# Patient Record
Sex: MALE | Race: WHITE | NOT HISPANIC OR LATINO | Employment: OTHER | URBAN - METROPOLITAN AREA
[De-identification: names, ages, dates, MRNs, and addresses within clinical notes are randomized per-mention and may not be internally consistent; named-entity substitution may affect disease eponyms.]

---

## 2017-05-11 ENCOUNTER — TRANSCRIBE ORDERS (OUTPATIENT)
Dept: ADMINISTRATIVE | Facility: HOSPITAL | Age: 67
End: 2017-05-11

## 2017-05-11 DIAGNOSIS — C61 PROSTATE CANCER (HCC): Primary | ICD-10-CM

## 2017-05-16 ENCOUNTER — HOSPITAL ENCOUNTER (OUTPATIENT)
Dept: NUCLEAR MEDICINE | Facility: HOSPITAL | Age: 67
Discharge: HOME | End: 2017-05-16
Attending: UROLOGY

## 2017-05-16 ENCOUNTER — HOSPITAL ENCOUNTER (OUTPATIENT)
Dept: CT IMAGING | Facility: HOSPITAL | Age: 67
Discharge: HOME | End: 2017-05-16
Attending: UROLOGY | Admitting: UROLOGY

## 2017-05-16 DIAGNOSIS — C61 PROSTATE CANCER (HCC): ICD-10-CM

## 2017-05-16 PROCEDURE — 0 TECHNETIUM MEDRONATE KIT: Performed by: UROLOGY

## 2017-05-16 PROCEDURE — 78306 BONE IMAGING WHOLE BODY: CPT

## 2017-05-16 PROCEDURE — 74177 CT ABD & PELVIS W/CONTRAST: CPT

## 2017-05-16 PROCEDURE — 0 IOPAMIDOL PER 1 ML: Performed by: UROLOGY

## 2017-05-16 PROCEDURE — A9503 TC99M MEDRONATE: HCPCS | Performed by: UROLOGY

## 2017-05-16 RX ORDER — TC 99M MEDRONATE 20 MG/10ML
22.8 INJECTION, POWDER, LYOPHILIZED, FOR SOLUTION INTRAVENOUS
Status: COMPLETED | OUTPATIENT
Start: 2017-05-16 | End: 2017-05-16

## 2017-05-16 RX ADMIN — IOPAMIDOL 100 ML: 755 INJECTION, SOLUTION INTRAVENOUS at 11:16

## 2017-05-16 RX ADMIN — Medication 22.8 MILLICURIE: at 10:30

## 2017-05-22 ENCOUNTER — TRANSCRIBE ORDERS (OUTPATIENT)
Dept: ADMINISTRATIVE | Facility: HOSPITAL | Age: 67
End: 2017-05-22

## 2017-05-22 DIAGNOSIS — R16.0 LIVER MASS: ICD-10-CM

## 2017-05-22 DIAGNOSIS — C61 PROSTATE CA (HCC): Primary | ICD-10-CM

## 2017-05-25 ENCOUNTER — HOSPITAL ENCOUNTER (OUTPATIENT)
Dept: MRI IMAGING | Facility: HOSPITAL | Age: 67
Discharge: HOME | End: 2017-05-25
Admitting: RADIOLOGY

## 2017-05-25 DIAGNOSIS — R16.0 LIVER MASS: ICD-10-CM

## 2017-05-25 DIAGNOSIS — C61 PROSTATE CA (HCC): ICD-10-CM

## 2017-05-25 PROCEDURE — A9577 INJ MULTIHANCE: HCPCS | Performed by: RADIOLOGY

## 2017-05-25 PROCEDURE — 74183 MRI ABD W/O CNTR FLWD CNTR: CPT

## 2017-05-25 PROCEDURE — 0 GADOBENATE DIMEGLUMINE 529 MG/ML SOLUTION: Performed by: RADIOLOGY

## 2017-05-25 RX ADMIN — GADOBENATE DIMEGLUMINE 18 ML: 529 INJECTION, SOLUTION INTRAVENOUS at 08:05

## 2017-08-28 ENCOUNTER — TRANSCRIBE ORDERS (OUTPATIENT)
Dept: ADMINISTRATIVE | Facility: HOSPITAL | Age: 67
End: 2017-08-28

## 2017-08-28 ENCOUNTER — LAB (OUTPATIENT)
Dept: LAB | Facility: HOSPITAL | Age: 67
End: 2017-08-28
Attending: UROLOGY

## 2017-08-28 ENCOUNTER — HOSPITAL ENCOUNTER (OUTPATIENT)
Dept: CARDIOLOGY | Facility: HOSPITAL | Age: 67
Discharge: HOME | End: 2017-08-28
Attending: UROLOGY | Admitting: UROLOGY

## 2017-08-28 DIAGNOSIS — C61 MALIGNANT NEOPLASM OF PROSTATE (HCC): ICD-10-CM

## 2017-08-28 DIAGNOSIS — C61 MALIGNANT NEOPLASM OF PROSTATE (HCC): Primary | ICD-10-CM

## 2017-08-28 LAB
ANION GAP SERPL CALCULATED.3IONS-SCNC: 12.9 MMOL/L
BASOPHILS # BLD AUTO: 0.04 10*3/MM3 (ref 0–0.2)
BASOPHILS NFR BLD AUTO: 0.8 % (ref 0–2)
BILIRUB UR QL STRIP: NEGATIVE
BUN BLD-MCNC: 21 MG/DL (ref 8–23)
BUN/CREAT SERPL: 20.6 (ref 7–25)
CALCIUM SPEC-SCNC: 8.8 MG/DL (ref 8.8–10.5)
CHLORIDE SERPL-SCNC: 100 MMOL/L (ref 98–107)
CLARITY UR: CLEAR
CO2 SERPL-SCNC: 26.1 MMOL/L (ref 22–29)
COLOR UR: YELLOW
CREAT BLD-MCNC: 1.02 MG/DL (ref 0.76–1.27)
DEPRECATED RDW RBC AUTO: 44.3 FL (ref 37–54)
EOSINOPHIL # BLD AUTO: 0.09 10*3/MM3 (ref 0.1–0.3)
EOSINOPHIL NFR BLD AUTO: 1.9 % (ref 0–4)
ERYTHROCYTE [DISTWIDTH] IN BLOOD BY AUTOMATED COUNT: 13 % (ref 11.5–14.5)
GFR SERPL CREATININE-BSD FRML MDRD: 73 ML/MIN/1.73
GLUCOSE BLD-MCNC: 104 MG/DL (ref 65–99)
GLUCOSE UR STRIP-MCNC: NEGATIVE MG/DL
HCT VFR BLD AUTO: 42.9 % (ref 42–52)
HGB BLD-MCNC: 14.6 G/DL (ref 14–18)
HGB UR QL STRIP.AUTO: NEGATIVE
IMM GRANULOCYTES # BLD: 0.01 10*3/MM3 (ref 0–0.03)
IMM GRANULOCYTES NFR BLD: 0.2 % (ref 0–0.5)
KETONES UR QL STRIP: NEGATIVE
LEUKOCYTE ESTERASE UR QL STRIP.AUTO: NEGATIVE
LYMPHOCYTES # BLD AUTO: 1.64 10*3/MM3 (ref 0.6–4.8)
LYMPHOCYTES NFR BLD AUTO: 33.7 % (ref 20–45)
MCH RBC QN AUTO: 31.8 PG (ref 27–31)
MCHC RBC AUTO-ENTMCNC: 34 G/DL (ref 31–37)
MCV RBC AUTO: 93.5 FL (ref 80–94)
MONOCYTES # BLD AUTO: 0.45 10*3/MM3 (ref 0–1)
MONOCYTES NFR BLD AUTO: 9.3 % (ref 3–8)
NEUTROPHILS # BLD AUTO: 2.63 10*3/MM3 (ref 1.5–8.3)
NEUTROPHILS NFR BLD AUTO: 54.1 % (ref 45–70)
NITRITE UR QL STRIP: NEGATIVE
NRBC BLD MANUAL-RTO: 0 /100 WBC (ref 0–0)
PH UR STRIP.AUTO: 7 [PH] (ref 4.5–8)
PLATELET # BLD AUTO: 170 10*3/MM3 (ref 140–500)
PMV BLD AUTO: 9.6 FL (ref 7.4–10.4)
POTASSIUM BLD-SCNC: 4.3 MMOL/L (ref 3.5–5.2)
PROT UR QL STRIP: NEGATIVE
PSA SERPL-MCNC: 2.26 NG/ML (ref 0–4)
RBC # BLD AUTO: 4.59 10*6/MM3 (ref 4.7–6.1)
SODIUM BLD-SCNC: 139 MMOL/L (ref 136–145)
SP GR UR STRIP: 1.01 (ref 1–1.03)
TESTOST SERPL-MCNC: 8.76 NG/DL (ref 193–740)
UROBILINOGEN UR QL STRIP: NORMAL
WBC NRBC COR # BLD: 4.86 10*3/MM3 (ref 4.8–10.8)

## 2017-08-28 PROCEDURE — 93005 ELECTROCARDIOGRAM TRACING: CPT | Performed by: UROLOGY

## 2017-08-28 PROCEDURE — 36415 COLL VENOUS BLD VENIPUNCTURE: CPT

## 2017-08-28 PROCEDURE — 93010 ELECTROCARDIOGRAM REPORT: CPT | Performed by: INTERNAL MEDICINE

## 2017-08-28 PROCEDURE — 85025 COMPLETE CBC W/AUTO DIFF WBC: CPT

## 2017-08-28 PROCEDURE — 84153 ASSAY OF PSA TOTAL: CPT

## 2017-08-28 PROCEDURE — 84403 ASSAY OF TOTAL TESTOSTERONE: CPT

## 2017-08-28 PROCEDURE — 81003 URINALYSIS AUTO W/O SCOPE: CPT

## 2017-08-28 PROCEDURE — 80048 BASIC METABOLIC PNL TOTAL CA: CPT

## 2018-10-15 ENCOUNTER — TRANSCRIBE ORDERS (OUTPATIENT)
Dept: ADMINISTRATIVE | Facility: HOSPITAL | Age: 68
End: 2018-10-15

## 2018-10-15 ENCOUNTER — HOSPITAL ENCOUNTER (OUTPATIENT)
Dept: GENERAL RADIOLOGY | Facility: HOSPITAL | Age: 68
Discharge: HOME | End: 2018-10-15
Attending: UROLOGY | Admitting: UROLOGY

## 2018-10-15 DIAGNOSIS — N20.0 CALCULUS, RENAL: Primary | ICD-10-CM

## 2018-10-15 DIAGNOSIS — N20.0 CALCULUS, RENAL: ICD-10-CM

## 2018-10-15 PROCEDURE — 74018 RADEX ABDOMEN 1 VIEW: CPT

## 2018-10-31 ENCOUNTER — APPOINTMENT (OUTPATIENT)
Dept: PREADMISSION TESTING | Facility: HOSPITAL | Age: 68
End: 2018-10-31

## 2018-10-31 VITALS
WEIGHT: 216 LBS | SYSTOLIC BLOOD PRESSURE: 162 MMHG | OXYGEN SATURATION: 95 % | RESPIRATION RATE: 20 BRPM | TEMPERATURE: 98.1 F | BODY MASS INDEX: 33.9 KG/M2 | DIASTOLIC BLOOD PRESSURE: 78 MMHG | HEIGHT: 67 IN | HEART RATE: 63 BPM

## 2018-10-31 LAB
ALBUMIN SERPL-MCNC: 4.3 G/DL (ref 3.5–5.2)
ALBUMIN/GLOB SERPL: 1.6 G/DL
ALP SERPL-CCNC: 62 U/L (ref 39–117)
ALT SERPL W P-5'-P-CCNC: 22 U/L (ref 1–41)
ANION GAP SERPL CALCULATED.3IONS-SCNC: 10.7 MMOL/L
AST SERPL-CCNC: 21 U/L (ref 1–40)
BACTERIA UR QL AUTO: NORMAL /HPF
BILIRUB SERPL-MCNC: 0.4 MG/DL (ref 0.1–1.2)
BILIRUB UR QL STRIP: NEGATIVE
BUN BLD-MCNC: 18 MG/DL (ref 8–23)
BUN/CREAT SERPL: 14.9 (ref 7–25)
CALCIUM SPEC-SCNC: 9.5 MG/DL (ref 8.6–10.5)
CHLORIDE SERPL-SCNC: 103 MMOL/L (ref 98–107)
CLARITY UR: CLEAR
CO2 SERPL-SCNC: 27.3 MMOL/L (ref 22–29)
COLOR UR: YELLOW
CREAT BLD-MCNC: 1.21 MG/DL (ref 0.76–1.27)
DEPRECATED RDW RBC AUTO: 45.4 FL (ref 37–54)
ERYTHROCYTE [DISTWIDTH] IN BLOOD BY AUTOMATED COUNT: 12.8 % (ref 11.5–14.5)
GFR SERPL CREATININE-BSD FRML MDRD: 59 ML/MIN/1.73
GLOBULIN UR ELPH-MCNC: 2.7 GM/DL
GLUCOSE BLD-MCNC: 100 MG/DL (ref 65–99)
GLUCOSE UR STRIP-MCNC: NEGATIVE MG/DL
HCT VFR BLD AUTO: 45.4 % (ref 40.4–52.2)
HGB BLD-MCNC: 14.4 G/DL (ref 13.7–17.6)
HGB UR QL STRIP.AUTO: NEGATIVE
HYALINE CASTS UR QL AUTO: NORMAL /LPF
KETONES UR QL STRIP: ABNORMAL
LEUKOCYTE ESTERASE UR QL STRIP.AUTO: ABNORMAL
MCH RBC QN AUTO: 30.9 PG (ref 27–32.7)
MCHC RBC AUTO-ENTMCNC: 31.7 G/DL (ref 32.6–36.4)
MCV RBC AUTO: 97.4 FL (ref 79.8–96.2)
NITRITE UR QL STRIP: NEGATIVE
PH UR STRIP.AUTO: 6.5 [PH] (ref 5–8)
PLATELET # BLD AUTO: 158 10*3/MM3 (ref 140–500)
PMV BLD AUTO: 10.2 FL (ref 6–12)
POTASSIUM BLD-SCNC: 4.2 MMOL/L (ref 3.5–5.2)
PROT SERPL-MCNC: 7 G/DL (ref 6–8.5)
PROT UR QL STRIP: NEGATIVE
RBC # BLD AUTO: 4.66 10*6/MM3 (ref 4.6–6)
RBC # UR: NORMAL /HPF
REF LAB TEST METHOD: NORMAL
SODIUM BLD-SCNC: 141 MMOL/L (ref 136–145)
SP GR UR STRIP: 1.02 (ref 1–1.03)
SQUAMOUS #/AREA URNS HPF: NORMAL /HPF
UROBILINOGEN UR QL STRIP: ABNORMAL
WBC NRBC COR # BLD: 5.54 10*3/MM3 (ref 4.5–10.7)
WBC UR QL AUTO: NORMAL /HPF

## 2018-10-31 PROCEDURE — 36415 COLL VENOUS BLD VENIPUNCTURE: CPT

## 2018-10-31 PROCEDURE — 85027 COMPLETE CBC AUTOMATED: CPT | Performed by: UROLOGY

## 2018-10-31 PROCEDURE — 80053 COMPREHEN METABOLIC PANEL: CPT | Performed by: UROLOGY

## 2018-10-31 PROCEDURE — 93010 ELECTROCARDIOGRAM REPORT: CPT | Performed by: INTERNAL MEDICINE

## 2018-10-31 PROCEDURE — 93005 ELECTROCARDIOGRAM TRACING: CPT

## 2018-10-31 PROCEDURE — 81001 URINALYSIS AUTO W/SCOPE: CPT | Performed by: UROLOGY

## 2018-10-31 RX ORDER — ASPIRIN 81 MG/1
81 TABLET ORAL DAILY
COMMUNITY
End: 2018-12-21 | Stop reason: HOSPADM

## 2018-10-31 RX ORDER — SILDENAFIL CITRATE 20 MG/1
10 TABLET ORAL DAILY
COMMUNITY

## 2018-10-31 RX ORDER — ATORVASTATIN CALCIUM 10 MG/1
10 TABLET, FILM COATED ORAL DAILY
COMMUNITY

## 2018-11-02 ENCOUNTER — ANESTHESIA EVENT (OUTPATIENT)
Dept: PERIOP | Facility: HOSPITAL | Age: 68
End: 2018-11-02

## 2018-11-02 ENCOUNTER — HOSPITAL ENCOUNTER (OUTPATIENT)
Facility: HOSPITAL | Age: 68
Setting detail: HOSPITAL OUTPATIENT SURGERY
Discharge: HOME | End: 2018-11-02
Attending: UROLOGY | Admitting: UROLOGY

## 2018-11-02 ENCOUNTER — ANESTHESIA (OUTPATIENT)
Dept: PERIOP | Facility: HOSPITAL | Age: 68
End: 2018-11-02

## 2018-11-02 VITALS
TEMPERATURE: 98.4 F | SYSTOLIC BLOOD PRESSURE: 147 MMHG | RESPIRATION RATE: 16 BRPM | HEART RATE: 51 BPM | DIASTOLIC BLOOD PRESSURE: 90 MMHG | OXYGEN SATURATION: 97 %

## 2018-11-02 PROBLEM — N20.0 RENAL CALCULUS, RIGHT: Status: ACTIVE | Noted: 2018-11-02

## 2018-11-02 PROCEDURE — 25010000002 PROPOFOL 10 MG/ML EMULSION: Performed by: NURSE ANESTHETIST, CERTIFIED REGISTERED

## 2018-11-02 PROCEDURE — 25010000002 KETOROLAC TROMETHAMINE PER 15 MG: Performed by: NURSE ANESTHETIST, CERTIFIED REGISTERED

## 2018-11-02 PROCEDURE — 25010000002 FENTANYL CITRATE (PF) 100 MCG/2ML SOLUTION: Performed by: NURSE ANESTHETIST, CERTIFIED REGISTERED

## 2018-11-02 PROCEDURE — 25010000002 ONDANSETRON PER 1 MG: Performed by: NURSE ANESTHETIST, CERTIFIED REGISTERED

## 2018-11-02 PROCEDURE — 25010000002 MIDAZOLAM PER 1 MG: Performed by: ANESTHESIOLOGY

## 2018-11-02 PROCEDURE — 25010000003 CEFAZOLIN IN DEXTROSE 2-4 GM/100ML-% SOLUTION: Performed by: UROLOGY

## 2018-11-02 RX ORDER — OXYCODONE AND ACETAMINOPHEN 7.5; 325 MG/1; MG/1
1 TABLET ORAL ONCE AS NEEDED
Status: DISCONTINUED | OUTPATIENT
Start: 2018-11-02 | End: 2018-11-02 | Stop reason: HOSPADM

## 2018-11-02 RX ORDER — SODIUM CHLORIDE 0.9 % (FLUSH) 0.9 %
1-10 SYRINGE (ML) INJECTION AS NEEDED
Status: DISCONTINUED | OUTPATIENT
Start: 2018-11-02 | End: 2018-11-02 | Stop reason: HOSPADM

## 2018-11-02 RX ORDER — EPHEDRINE SULFATE 50 MG/ML
5 INJECTION, SOLUTION INTRAVENOUS ONCE AS NEEDED
Status: DISCONTINUED | OUTPATIENT
Start: 2018-11-02 | End: 2018-11-02 | Stop reason: HOSPADM

## 2018-11-02 RX ORDER — ONDANSETRON 2 MG/ML
4 INJECTION INTRAMUSCULAR; INTRAVENOUS ONCE AS NEEDED
Status: DISCONTINUED | OUTPATIENT
Start: 2018-11-02 | End: 2018-11-02 | Stop reason: HOSPADM

## 2018-11-02 RX ORDER — MIDAZOLAM HYDROCHLORIDE 1 MG/ML
2 INJECTION INTRAMUSCULAR; INTRAVENOUS
Status: DISCONTINUED | OUTPATIENT
Start: 2018-11-02 | End: 2018-11-02 | Stop reason: HOSPADM

## 2018-11-02 RX ORDER — CEFAZOLIN SODIUM 2 G/100ML
2 INJECTION, SOLUTION INTRAVENOUS EVERY 8 HOURS
Status: COMPLETED | OUTPATIENT
Start: 2018-11-02 | End: 2018-11-02

## 2018-11-02 RX ORDER — LIDOCAINE HYDROCHLORIDE 20 MG/ML
INJECTION, SOLUTION INFILTRATION; PERINEURAL AS NEEDED
Status: DISCONTINUED | OUTPATIENT
Start: 2018-11-02 | End: 2018-11-02 | Stop reason: SURG

## 2018-11-02 RX ORDER — FLUMAZENIL 0.1 MG/ML
0.2 INJECTION INTRAVENOUS AS NEEDED
Status: DISCONTINUED | OUTPATIENT
Start: 2018-11-02 | End: 2018-11-02 | Stop reason: HOSPADM

## 2018-11-02 RX ORDER — PROMETHAZINE HYDROCHLORIDE 25 MG/1
25 TABLET ORAL ONCE AS NEEDED
Status: DISCONTINUED | OUTPATIENT
Start: 2018-11-02 | End: 2018-11-02 | Stop reason: HOSPADM

## 2018-11-02 RX ORDER — PROMETHAZINE HYDROCHLORIDE 25 MG/1
25 SUPPOSITORY RECTAL ONCE AS NEEDED
Status: DISCONTINUED | OUTPATIENT
Start: 2018-11-02 | End: 2018-11-02 | Stop reason: HOSPADM

## 2018-11-02 RX ORDER — LABETALOL HYDROCHLORIDE 5 MG/ML
5 INJECTION, SOLUTION INTRAVENOUS
Status: DISCONTINUED | OUTPATIENT
Start: 2018-11-02 | End: 2018-11-02 | Stop reason: HOSPADM

## 2018-11-02 RX ORDER — DIPHENHYDRAMINE HYDROCHLORIDE 50 MG/ML
12.5 INJECTION INTRAMUSCULAR; INTRAVENOUS
Status: DISCONTINUED | OUTPATIENT
Start: 2018-11-02 | End: 2018-11-02 | Stop reason: HOSPADM

## 2018-11-02 RX ORDER — HYDROMORPHONE HYDROCHLORIDE 1 MG/ML
0.5 INJECTION, SOLUTION INTRAMUSCULAR; INTRAVENOUS; SUBCUTANEOUS
Status: DISCONTINUED | OUTPATIENT
Start: 2018-11-02 | End: 2018-11-02 | Stop reason: HOSPADM

## 2018-11-02 RX ORDER — PROMETHAZINE HYDROCHLORIDE 25 MG/ML
12.5 INJECTION, SOLUTION INTRAMUSCULAR; INTRAVENOUS ONCE AS NEEDED
Status: DISCONTINUED | OUTPATIENT
Start: 2018-11-02 | End: 2018-11-02 | Stop reason: HOSPADM

## 2018-11-02 RX ORDER — SODIUM CHLORIDE, SODIUM LACTATE, POTASSIUM CHLORIDE, CALCIUM CHLORIDE 600; 310; 30; 20 MG/100ML; MG/100ML; MG/100ML; MG/100ML
9 INJECTION, SOLUTION INTRAVENOUS CONTINUOUS
Status: DISCONTINUED | OUTPATIENT
Start: 2018-11-02 | End: 2018-11-02 | Stop reason: HOSPADM

## 2018-11-02 RX ORDER — HYDROCODONE BITARTRATE AND ACETAMINOPHEN 7.5; 325 MG/1; MG/1
1-2 TABLET ORAL EVERY 4 HOURS PRN
Qty: 20 TABLET | Refills: 0 | Status: SHIPPED | OUTPATIENT
Start: 2018-11-02 | End: 2018-12-17

## 2018-11-02 RX ORDER — FENTANYL CITRATE 50 UG/ML
INJECTION, SOLUTION INTRAMUSCULAR; INTRAVENOUS AS NEEDED
Status: DISCONTINUED | OUTPATIENT
Start: 2018-11-02 | End: 2018-11-02 | Stop reason: SURG

## 2018-11-02 RX ORDER — MIDAZOLAM HYDROCHLORIDE 1 MG/ML
1 INJECTION INTRAMUSCULAR; INTRAVENOUS
Status: DISCONTINUED | OUTPATIENT
Start: 2018-11-02 | End: 2018-11-02 | Stop reason: HOSPADM

## 2018-11-02 RX ORDER — LIDOCAINE HYDROCHLORIDE 10 MG/ML
0.5 INJECTION, SOLUTION EPIDURAL; INFILTRATION; INTRACAUDAL; PERINEURAL ONCE AS NEEDED
Status: DISCONTINUED | OUTPATIENT
Start: 2018-11-02 | End: 2018-11-02 | Stop reason: HOSPADM

## 2018-11-02 RX ORDER — NALOXONE HCL 0.4 MG/ML
0.2 VIAL (ML) INJECTION AS NEEDED
Status: DISCONTINUED | OUTPATIENT
Start: 2018-11-02 | End: 2018-11-02 | Stop reason: HOSPADM

## 2018-11-02 RX ORDER — FENTANYL CITRATE 50 UG/ML
50 INJECTION, SOLUTION INTRAMUSCULAR; INTRAVENOUS
Status: DISCONTINUED | OUTPATIENT
Start: 2018-11-02 | End: 2018-11-02 | Stop reason: HOSPADM

## 2018-11-02 RX ORDER — ONDANSETRON 2 MG/ML
INJECTION INTRAMUSCULAR; INTRAVENOUS AS NEEDED
Status: DISCONTINUED | OUTPATIENT
Start: 2018-11-02 | End: 2018-11-02 | Stop reason: SURG

## 2018-11-02 RX ORDER — FAMOTIDINE 10 MG/ML
20 INJECTION, SOLUTION INTRAVENOUS ONCE
Status: COMPLETED | OUTPATIENT
Start: 2018-11-02 | End: 2018-11-02

## 2018-11-02 RX ORDER — HYDROCODONE BITARTRATE AND ACETAMINOPHEN 7.5; 325 MG/1; MG/1
1 TABLET ORAL ONCE AS NEEDED
Status: COMPLETED | OUTPATIENT
Start: 2018-11-02 | End: 2018-11-02

## 2018-11-02 RX ORDER — CIPROFLOXACIN 500 MG/1
500 TABLET, FILM COATED ORAL 2 TIMES DAILY
Qty: 6 TABLET | Refills: 0 | Status: SHIPPED | OUTPATIENT
Start: 2018-11-02 | End: 2018-11-05

## 2018-11-02 RX ORDER — PROMETHAZINE HYDROCHLORIDE 25 MG/1
12.5 TABLET ORAL ONCE AS NEEDED
Status: DISCONTINUED | OUTPATIENT
Start: 2018-11-02 | End: 2018-11-02 | Stop reason: HOSPADM

## 2018-11-02 RX ORDER — KETOROLAC TROMETHAMINE 30 MG/ML
INJECTION, SOLUTION INTRAMUSCULAR; INTRAVENOUS AS NEEDED
Status: DISCONTINUED | OUTPATIENT
Start: 2018-11-02 | End: 2018-11-02 | Stop reason: SURG

## 2018-11-02 RX ORDER — PROPOFOL 10 MG/ML
VIAL (ML) INTRAVENOUS AS NEEDED
Status: DISCONTINUED | OUTPATIENT
Start: 2018-11-02 | End: 2018-11-02 | Stop reason: SURG

## 2018-11-02 RX ADMIN — MIDAZOLAM 2 MG: 1 INJECTION INTRAMUSCULAR; INTRAVENOUS at 09:12

## 2018-11-02 RX ADMIN — FENTANYL CITRATE 100 MCG: 50 INJECTION INTRAMUSCULAR; INTRAVENOUS at 09:24

## 2018-11-02 RX ADMIN — CEFAZOLIN SODIUM 2 G: 2 INJECTION, SOLUTION INTRAVENOUS at 09:27

## 2018-11-02 RX ADMIN — KETOROLAC TROMETHAMINE 30 MG: 30 INJECTION, SOLUTION INTRAMUSCULAR; INTRAVENOUS at 09:33

## 2018-11-02 RX ADMIN — LIDOCAINE HYDROCHLORIDE 60 MG: 20 INJECTION, SOLUTION INFILTRATION; PERINEURAL at 09:24

## 2018-11-02 RX ADMIN — PROPOFOL 200 MG: 10 INJECTION, EMULSION INTRAVENOUS at 09:24

## 2018-11-02 RX ADMIN — FAMOTIDINE 20 MG: 10 INJECTION INTRAVENOUS at 09:12

## 2018-11-02 RX ADMIN — ONDANSETRON 4 MG: 2 INJECTION INTRAMUSCULAR; INTRAVENOUS at 09:33

## 2018-11-02 RX ADMIN — SODIUM CHLORIDE, POTASSIUM CHLORIDE, SODIUM LACTATE AND CALCIUM CHLORIDE 9 ML/HR: 600; 310; 30; 20 INJECTION, SOLUTION INTRAVENOUS at 08:13

## 2018-11-02 RX ADMIN — HYDROCODONE BITARTRATE AND ACETAMINOPHEN 1 TABLET: 7.5; 325 TABLET ORAL at 10:26

## 2018-11-02 NOTE — ANESTHESIA POSTPROCEDURE EVALUATION
Patient: Christiano Sandoval    Procedure Summary     Date:  11/02/18 Room / Location:   [facility]    Anesthesia Start:  0922 Anesthesia Stop:  1005    Procedure:  RIGHT EXTRACORPOREAL SHOCKWAVE LITHOTRIPSY (Right ) Diagnosis:      Surgeon:  Kevin Manzano MD Provider:  Reji Arias MD    Anesthesia Type:  general ASA Status:  3          Anesthesia Type: general  Last vitals  BP   147/90 (11/02/18 1045)   Temp   36.9 °C (98.4 °F) (11/02/18 1002)   Pulse   51 (11/02/18 1045)   Resp   16 (11/02/18 1045)     SpO2   97 % (11/02/18 1045)     Post Anesthesia Care and Evaluation    Patient location during evaluation: bedside  Patient participation: complete - patient participated  Level of consciousness: awake and alert  Pain management: adequate  Airway patency: patent  Anesthetic complications: No anesthetic complications    Cardiovascular status: acceptable  Respiratory status: acceptable  Hydration status: acceptable    Comments: /90   Pulse 51   Temp 36.9 °C (98.4 °F) (Temporal Artery )   Resp 16   SpO2 97%

## 2018-11-02 NOTE — ANESTHESIA PREPROCEDURE EVALUATION
Anesthesia Evaluation     Patient summary reviewed and Nursing notes reviewed   no history of anesthetic complications:               Airway   Mallampati: III  TM distance: >3 FB  Neck ROM: full  Large neck circumference  Dental - normal exam     Pulmonary - negative pulmonary ROS    breath sounds clear to auscultation  (-) shortness of breath, sleep apnea, decreased breath sounds, wheezes  Cardiovascular - normal exam  Exercise tolerance: good (4-7 METS)    Rhythm: regular  Rate: normal    (+) hyperlipidemia,   (-) past MI, angina, CHF, orthopnea, PND, COKER, PVD      Neuro/Psych- negative ROS  (-) seizures, neuromuscular disease, TIA, CVA, dizziness/light headedness, weakness, numbness  GI/Hepatic/Renal/Endo    (+) obesity,  GERD,    (-) liver disease, diabetes    Musculoskeletal (-) negative ROS    Abdominal  - normal exam   Substance History - negative use  (-) alcohol use, drug use     OB/GYN negative ob/gyn ROS         Other - negative ROS                       Anesthesia Plan    ASA 3     general     intravenous induction   Anesthetic plan, all risks, benefits, and alternatives have been provided, discussed and informed consent has been obtained with: patient.    Plan discussed with CRNA.

## 2018-11-02 NOTE — H&P
FIRST UROLOGY CONSULT      Patient Identification:  NAME:  Christiano Sandoval  Age:  68 y.o.   Sex:  male   :  1950   MRN:  [id number]       Chief complaint: 68-year-old with kidney stone    History of present illness: *History of prostate cancer.  The have a 15 mm right lower pole stone and scheduled for surgical treatment.  He is a patient Dr. Randy Lindsay  Past medical history:  Past Medical History:   Diagnosis Date   • Cancer (CMS/HCC)     PROSTATE   • GERD (gastroesophageal reflux disease)    • History of hepatitis B    • Hyperlipidemia    • Kidney stone        Past surgical history:  Past Surgical History:   Procedure Laterality Date   • COLONOSCOPY         Allergies:  Patient has no known allergies.    Home medications:  Prescriptions Prior to Admission   Medication Sig Dispense Refill Last Dose   • atorvastatin (LIPITOR) 10 MG tablet Take 10 mg by mouth Daily.   2018 at Unknown time   • sildenafil (REVATIO) 20 MG tablet Take 20 mg by mouth Daily.   2018 at Unknown time   • aspirin 81 MG EC tablet Take 81 mg by mouth Daily.   10/26/2018        Hospital medications:    ceFAZolin 2 g Intravenous Q8H            Family history:  Family History   Problem Relation Age of Onset   • Malig Hyperthermia Neg Hx        Social history:  Social History   Substance Use Topics   • Smoking status: Former Smoker     Packs/day: 0.50     Types: Cigarettes   • Smokeless tobacco: Never Used      Comment: QUIT 40 YRS AGO   • Alcohol use 1.8 oz/week     3 Cans of beer per week      Comment: DAILY       Review of systems:    Negative 12-system ROS except for the following:        Objective:  TMax 24 hours:   Temp (24hrs), Av °F (36.7 °C), Min:98 °F (36.7 °C), Max:98 °F (36.7 °C)      Vitals Ranges:   Temp:  [98 °F (36.7 °C)] 98 °F (36.7 °C)  Heart Rate:  [64] 64  Resp:  [18] 18  BP: (146)/(77) 146/77    Intake/Output Last 3 shifts:  No intake/output data recorded.     Physical Exam:       General  Appearance:    Alert, cooperative, in no acute distress   Head:    Normocephalic, without obvious abnormality, atraumatic   Eyes:          PERRL, conjunctivae and corneas clear   Ears:    Normal external inspection   Throat:   No oral lesions, oral mucosa moist   Neck:   Supple, no LAD, trachea midline   Back:     No CVA tenderness   Lungs:     Respirations unlabored, symmetric excursion    Heart:    RRR, intact peripheral pulses   Abdomen:     Soft, NDNT, no masses, no guarding   :    Testes descended bilaterally, no nodules.  Penis normal.  No scrotal or penile rashes noted   Extremities:   No edema, no deformity   Skin:   No bleeding, bruising or rashes   Neuro/Psych:   Orientation intact, mood/affect pleasant, no focal findings       Results review:   I reviewed the patient's new clinical results.    Data review:  Lab Results (last 24 hours)     ** No results found for the last 24 hours. **           Imaging:  Imaging Results (last 24 hours)     ** No results found for the last 24 hours. **             Assessment:       * No active hospital problems. *    Right renal calculus    Plan:     R ESWL    Kevin Manzano MD  11/02/18  8:32 AM

## 2018-11-02 NOTE — ANESTHESIA PROCEDURE NOTES
Airway  Urgency: elective      General Information and Staff    Patient location during procedure: OR  Anesthesiologist: JOI GRIFFITH  CRNA: JENNIE CAMACHO    Indications and Patient Condition    Preoxygenated: yes  Mask difficulty assessment: 0 - not attempted    Final Airway Details  Final airway type: supraglottic airway      Successful airway: unique  Size 5    Number of attempts at approach: 1    Additional Comments  Atraumatic, adeq seal, secured, MV/AV until SV

## 2018-11-14 ENCOUNTER — TRANSCRIBE ORDERS (OUTPATIENT)
Dept: ADMINISTRATIVE | Facility: HOSPITAL | Age: 68
End: 2018-11-14

## 2018-11-14 ENCOUNTER — HOSPITAL ENCOUNTER (OUTPATIENT)
Dept: GENERAL RADIOLOGY | Facility: HOSPITAL | Age: 68
Discharge: HOME | End: 2018-11-14
Attending: UROLOGY | Admitting: UROLOGY

## 2018-11-14 DIAGNOSIS — N20.0 CALCULUS, RENAL: ICD-10-CM

## 2018-11-14 DIAGNOSIS — N20.0 CALCULUS, RENAL: Primary | ICD-10-CM

## 2018-11-14 PROCEDURE — 74018 RADEX ABDOMEN 1 VIEW: CPT

## 2018-12-14 ENCOUNTER — APPOINTMENT (OUTPATIENT)
Dept: PREADMISSION TESTING | Facility: HOSPITAL | Age: 68
End: 2018-12-14

## 2018-12-17 ENCOUNTER — APPOINTMENT (OUTPATIENT)
Dept: PREADMISSION TESTING | Facility: HOSPITAL | Age: 68
End: 2018-12-17

## 2018-12-17 VITALS
DIASTOLIC BLOOD PRESSURE: 87 MMHG | HEART RATE: 62 BPM | RESPIRATION RATE: 16 BRPM | SYSTOLIC BLOOD PRESSURE: 169 MMHG | BODY MASS INDEX: 34.53 KG/M2 | WEIGHT: 220 LBS | TEMPERATURE: 98.7 F | OXYGEN SATURATION: 98 % | HEIGHT: 67 IN

## 2018-12-17 LAB
ANION GAP SERPL CALCULATED.3IONS-SCNC: 10.3 MMOL/L
BILIRUB UR QL STRIP: NEGATIVE
BUN BLD-MCNC: 18 MG/DL (ref 8–23)
BUN/CREAT SERPL: 19.6 (ref 7–25)
CALCIUM SPEC-SCNC: 9.1 MG/DL (ref 8.6–10.5)
CHLORIDE SERPL-SCNC: 103 MMOL/L (ref 98–107)
CLARITY UR: CLEAR
CO2 SERPL-SCNC: 26.7 MMOL/L (ref 22–29)
COLOR UR: YELLOW
CREAT BLD-MCNC: 0.92 MG/DL (ref 0.76–1.27)
DEPRECATED RDW RBC AUTO: 44 FL (ref 37–54)
ERYTHROCYTE [DISTWIDTH] IN BLOOD BY AUTOMATED COUNT: 12.9 % (ref 11.5–14.5)
GFR SERPL CREATININE-BSD FRML MDRD: 81 ML/MIN/1.73
GLUCOSE BLD-MCNC: 115 MG/DL (ref 65–99)
GLUCOSE UR STRIP-MCNC: NEGATIVE MG/DL
HCT VFR BLD AUTO: 42.3 % (ref 40.4–52.2)
HGB BLD-MCNC: 14.4 G/DL (ref 13.7–17.6)
HGB UR QL STRIP.AUTO: NEGATIVE
KETONES UR QL STRIP: NEGATIVE
LEUKOCYTE ESTERASE UR QL STRIP.AUTO: NEGATIVE
MCH RBC QN AUTO: 32.1 PG (ref 27–32.7)
MCHC RBC AUTO-ENTMCNC: 34 G/DL (ref 32.6–36.4)
MCV RBC AUTO: 94.2 FL (ref 79.8–96.2)
NITRITE UR QL STRIP: NEGATIVE
PH UR STRIP.AUTO: 7 [PH] (ref 5–8)
PLATELET # BLD AUTO: 164 10*3/MM3 (ref 140–500)
PMV BLD AUTO: 9.9 FL (ref 6–12)
POTASSIUM BLD-SCNC: 4.3 MMOL/L (ref 3.5–5.2)
PROT UR QL STRIP: NEGATIVE
RBC # BLD AUTO: 4.49 10*6/MM3 (ref 4.6–6)
SODIUM BLD-SCNC: 140 MMOL/L (ref 136–145)
SP GR UR STRIP: 1.02 (ref 1–1.03)
UROBILINOGEN UR QL STRIP: NORMAL
WBC NRBC COR # BLD: 4.86 10*3/MM3 (ref 4.5–10.7)

## 2018-12-17 PROCEDURE — 80048 BASIC METABOLIC PNL TOTAL CA: CPT | Performed by: UROLOGY

## 2018-12-17 PROCEDURE — 81003 URINALYSIS AUTO W/O SCOPE: CPT | Performed by: UROLOGY

## 2018-12-17 PROCEDURE — 85027 COMPLETE CBC AUTOMATED: CPT | Performed by: UROLOGY

## 2018-12-17 PROCEDURE — 36415 COLL VENOUS BLD VENIPUNCTURE: CPT

## 2018-12-17 NOTE — DISCHARGE INSTRUCTIONS
Take the following medications the morning of surgery with a small sip of water:  NONE    PLEASE ARRIVE AT THE HOSPITAL AT 11 AM ON December 21, 2018    General Instructions:  • Do not eat or drink anything after 3 AM the night before surgery.  • Infants may have breast milk up to four hours before surgery.  • Infants drinking formula may drink formula up to six hours before surgery.   • Patients who avoid smoking, chewing tobacco and alcohol for 4 weeks prior to surgery have a reduced risk of post-operative complications.  Quit smoking as many days before surgery as you can.  • Do not smoke, use chewing tobacco or drink alcohol the day of surgery.   • If applicable bring your C-PAP/ BI-PAP machine.  • Bring any papers given to you in the doctor’s office.  • Wear clean comfortable clothes and socks.  • Do not wear contact lenses or make-up.  Bring a case for your glasses.   • Bring crutches or walker if applicable.  • Remove all piercings.  Leave jewelry and any other valuables at home.  • Hair extensions with metal clips must be removed prior to surgery.  • The Pre-Admission Testing nurse will instruct you to bring medications if unable to obtain an accurate list in Pre-Admission Testing.      Preventing a Surgical Site Infection:  • For 2 to 3 days before surgery, avoid shaving with a razor because the razor can irritate skin and make it easier to develop an infection.    • Any areas of open skin can increase the risk of a post-operative wound infection by allowing bacteria to enter and travel throughout the body.  Notify your surgeon if you have any skin wounds / rashes even if it is not near the expected surgical site.  The area will need assessed to determine if surgery should be delayed until it is healed.  • The night prior to surgery sleep in a clean bed with clean clothing.  Do not allow pets to sleep with you.  • Shower on the morning of surgery using a fresh bar of anti-bacterial soap (such as Dial) and  clean washcloth.  Dry with a clean towel and dress in clean clothing.  • Ask your surgeon if you will be receiving antibiotics prior to surgery.  • Make sure you, your family, and all healthcare providers clean their hands with soap and water or an alcohol based hand  before caring for you or your wound.    Day of surgery:  Upon arrival, a Pre-op nurse and Anesthesiologist will review your health history, obtain vital signs, and answer questions you may have.  The only belongings needed at this time will be your home medications and if applicable your C-PAP/BI-PAP machine.  If you are staying overnight your family can leave the rest of your belongings in the car and bring them to your room later.  A Pre-op nurse will start an IV and you may receive medication in preparation for surgery, including something to help you relax.  Your family will be able to see you in the Pre-op area.  While you are in surgery your family should notify the waiting room  if they leave the waiting room area and provide a contact phone number.    Please be aware that surgery does come with discomfort.  We want to make every effort to control your discomfort so please discuss any uncontrolled symptoms with your nurse.   Your doctor will most likely have prescribed pain medications.      If you are going home after surgery you will receive individualized written care instructions before being discharged.  A responsible adult must drive you to and from the hospital on the day of your surgery and stay with you for 24 hours.    If you are staying overnight following surgery, you will be transported to your hospital room following the recovery period.  [facility] has all private rooms.    You have received a list of surgical assistants for your reference.  If you have any questions please call Pre-Admission Testing at [phone].  Deductibles and co-payments are collected on the day of service. Please be  prepared to pay the required co-pay, deductible or deposit on the day of service as defined by your plan.

## 2018-12-21 ENCOUNTER — HOSPITAL ENCOUNTER (OUTPATIENT)
Facility: HOSPITAL | Age: 68
Setting detail: HOSPITAL OUTPATIENT SURGERY
Discharge: HOME | End: 2018-12-21
Attending: UROLOGY | Admitting: UROLOGY

## 2018-12-21 ENCOUNTER — ANESTHESIA (OUTPATIENT)
Dept: PERIOP | Facility: HOSPITAL | Age: 68
End: 2018-12-21

## 2018-12-21 ENCOUNTER — ANESTHESIA EVENT (OUTPATIENT)
Dept: PERIOP | Facility: HOSPITAL | Age: 68
End: 2018-12-21

## 2018-12-21 VITALS
DIASTOLIC BLOOD PRESSURE: 92 MMHG | OXYGEN SATURATION: 97 % | HEART RATE: 57 BPM | TEMPERATURE: 98.6 F | RESPIRATION RATE: 16 BRPM | SYSTOLIC BLOOD PRESSURE: 157 MMHG

## 2018-12-21 PROCEDURE — 25010000002 PROPOFOL 10 MG/ML EMULSION: Performed by: NURSE ANESTHETIST, CERTIFIED REGISTERED

## 2018-12-21 PROCEDURE — 25010000002 ONDANSETRON PER 1 MG: Performed by: NURSE ANESTHETIST, CERTIFIED REGISTERED

## 2018-12-21 PROCEDURE — 25010000003 CEFAZOLIN IN DEXTROSE 2-4 GM/100ML-% SOLUTION: Performed by: UROLOGY

## 2018-12-21 PROCEDURE — 25010000002 DEXAMETHASONE PER 1 MG: Performed by: NURSE ANESTHETIST, CERTIFIED REGISTERED

## 2018-12-21 PROCEDURE — 25010000002 MIDAZOLAM PER 1 MG: Performed by: ANESTHESIOLOGY

## 2018-12-21 RX ORDER — DEXAMETHASONE SODIUM PHOSPHATE 10 MG/ML
INJECTION INTRAMUSCULAR; INTRAVENOUS AS NEEDED
Status: DISCONTINUED | OUTPATIENT
Start: 2018-12-21 | End: 2018-12-21 | Stop reason: SURG

## 2018-12-21 RX ORDER — ONDANSETRON 4 MG/1
4 TABLET, FILM COATED ORAL ONCE AS NEEDED
Status: DISCONTINUED | OUTPATIENT
Start: 2018-12-21 | End: 2018-12-21 | Stop reason: HOSPADM

## 2018-12-21 RX ORDER — ACETAMINOPHEN 325 MG/1
650 TABLET ORAL ONCE AS NEEDED
Status: DISCONTINUED | OUTPATIENT
Start: 2018-12-21 | End: 2018-12-21 | Stop reason: HOSPADM

## 2018-12-21 RX ORDER — CEFAZOLIN SODIUM 2 G/100ML
2 INJECTION, SOLUTION INTRAVENOUS ONCE
Status: COMPLETED | OUTPATIENT
Start: 2018-12-21 | End: 2018-12-21

## 2018-12-21 RX ORDER — DIPHENHYDRAMINE HCL 25 MG
25 CAPSULE ORAL
Status: DISCONTINUED | OUTPATIENT
Start: 2018-12-21 | End: 2018-12-21 | Stop reason: HOSPADM

## 2018-12-21 RX ORDER — EPHEDRINE SULFATE 50 MG/ML
5 INJECTION, SOLUTION INTRAVENOUS ONCE AS NEEDED
Status: DISCONTINUED | OUTPATIENT
Start: 2018-12-21 | End: 2018-12-21 | Stop reason: HOSPADM

## 2018-12-21 RX ORDER — SODIUM CHLORIDE 0.9 % (FLUSH) 0.9 %
1-10 SYRINGE (ML) INJECTION AS NEEDED
Status: DISCONTINUED | OUTPATIENT
Start: 2018-12-21 | End: 2018-12-21 | Stop reason: HOSPADM

## 2018-12-21 RX ORDER — PROMETHAZINE HYDROCHLORIDE 25 MG/1
25 TABLET ORAL ONCE AS NEEDED
Status: DISCONTINUED | OUTPATIENT
Start: 2018-12-21 | End: 2018-12-21 | Stop reason: HOSPADM

## 2018-12-21 RX ORDER — FLUMAZENIL 0.1 MG/ML
0.2 INJECTION INTRAVENOUS AS NEEDED
Status: DISCONTINUED | OUTPATIENT
Start: 2018-12-21 | End: 2018-12-21 | Stop reason: HOSPADM

## 2018-12-21 RX ORDER — PROPOFOL 10 MG/ML
VIAL (ML) INTRAVENOUS AS NEEDED
Status: DISCONTINUED | OUTPATIENT
Start: 2018-12-21 | End: 2018-12-21 | Stop reason: SURG

## 2018-12-21 RX ORDER — HYDROCODONE BITARTRATE AND ACETAMINOPHEN 7.5; 325 MG/1; MG/1
1 TABLET ORAL ONCE AS NEEDED
Status: DISCONTINUED | OUTPATIENT
Start: 2018-12-21 | End: 2018-12-21 | Stop reason: HOSPADM

## 2018-12-21 RX ORDER — PROMETHAZINE HYDROCHLORIDE 25 MG/1
25 SUPPOSITORY RECTAL ONCE AS NEEDED
Status: DISCONTINUED | OUTPATIENT
Start: 2018-12-21 | End: 2018-12-21 | Stop reason: HOSPADM

## 2018-12-21 RX ORDER — LIDOCAINE HYDROCHLORIDE 10 MG/ML
0.5 INJECTION, SOLUTION EPIDURAL; INFILTRATION; INTRACAUDAL; PERINEURAL ONCE AS NEEDED
Status: COMPLETED | OUTPATIENT
Start: 2018-12-21 | End: 2018-12-21

## 2018-12-21 RX ORDER — OXYCODONE AND ACETAMINOPHEN 7.5; 325 MG/1; MG/1
1 TABLET ORAL ONCE AS NEEDED
Status: DISCONTINUED | OUTPATIENT
Start: 2018-12-21 | End: 2018-12-21 | Stop reason: HOSPADM

## 2018-12-21 RX ORDER — FENTANYL CITRATE 50 UG/ML
50 INJECTION, SOLUTION INTRAMUSCULAR; INTRAVENOUS
Status: DISCONTINUED | OUTPATIENT
Start: 2018-12-21 | End: 2018-12-21 | Stop reason: HOSPADM

## 2018-12-21 RX ORDER — LIDOCAINE HYDROCHLORIDE 20 MG/ML
INJECTION, SOLUTION INFILTRATION; PERINEURAL AS NEEDED
Status: DISCONTINUED | OUTPATIENT
Start: 2018-12-21 | End: 2018-12-21 | Stop reason: SURG

## 2018-12-21 RX ORDER — DIPHENHYDRAMINE HYDROCHLORIDE 50 MG/ML
12.5 INJECTION INTRAMUSCULAR; INTRAVENOUS
Status: DISCONTINUED | OUTPATIENT
Start: 2018-12-21 | End: 2018-12-21 | Stop reason: HOSPADM

## 2018-12-21 RX ORDER — ACETAMINOPHEN 650 MG/1
650 SUPPOSITORY RECTAL ONCE AS NEEDED
Status: DISCONTINUED | OUTPATIENT
Start: 2018-12-21 | End: 2018-12-21 | Stop reason: HOSPADM

## 2018-12-21 RX ORDER — OXYCODONE HYDROCHLORIDE AND ACETAMINOPHEN 5; 325 MG/1; MG/1
1 TABLET ORAL ONCE AS NEEDED
Status: DISCONTINUED | OUTPATIENT
Start: 2018-12-21 | End: 2018-12-21 | Stop reason: HOSPADM

## 2018-12-21 RX ORDER — SODIUM CHLORIDE, SODIUM LACTATE, POTASSIUM CHLORIDE, CALCIUM CHLORIDE 600; 310; 30; 20 MG/100ML; MG/100ML; MG/100ML; MG/100ML
9 INJECTION, SOLUTION INTRAVENOUS CONTINUOUS
Status: DISCONTINUED | OUTPATIENT
Start: 2018-12-21 | End: 2018-12-21 | Stop reason: HOSPADM

## 2018-12-21 RX ORDER — FAMOTIDINE 10 MG/ML
20 INJECTION, SOLUTION INTRAVENOUS ONCE
Status: COMPLETED | OUTPATIENT
Start: 2018-12-21 | End: 2018-12-21

## 2018-12-21 RX ORDER — HYDRALAZINE HYDROCHLORIDE 20 MG/ML
5 INJECTION INTRAMUSCULAR; INTRAVENOUS
Status: DISCONTINUED | OUTPATIENT
Start: 2018-12-21 | End: 2018-12-21 | Stop reason: HOSPADM

## 2018-12-21 RX ORDER — LABETALOL HYDROCHLORIDE 5 MG/ML
5 INJECTION, SOLUTION INTRAVENOUS
Status: DISCONTINUED | OUTPATIENT
Start: 2018-12-21 | End: 2018-12-21 | Stop reason: HOSPADM

## 2018-12-21 RX ORDER — PROMETHAZINE HYDROCHLORIDE 25 MG/ML
12.5 INJECTION, SOLUTION INTRAMUSCULAR; INTRAVENOUS ONCE AS NEEDED
Status: DISCONTINUED | OUTPATIENT
Start: 2018-12-21 | End: 2018-12-21 | Stop reason: HOSPADM

## 2018-12-21 RX ORDER — ONDANSETRON 2 MG/ML
4 INJECTION INTRAMUSCULAR; INTRAVENOUS ONCE AS NEEDED
Status: DISCONTINUED | OUTPATIENT
Start: 2018-12-21 | End: 2018-12-21 | Stop reason: HOSPADM

## 2018-12-21 RX ORDER — NALOXONE HCL 0.4 MG/ML
0.2 VIAL (ML) INJECTION AS NEEDED
Status: DISCONTINUED | OUTPATIENT
Start: 2018-12-21 | End: 2018-12-21 | Stop reason: HOSPADM

## 2018-12-21 RX ORDER — MIDAZOLAM HYDROCHLORIDE 1 MG/ML
1 INJECTION INTRAMUSCULAR; INTRAVENOUS
Status: DISCONTINUED | OUTPATIENT
Start: 2018-12-21 | End: 2018-12-21 | Stop reason: HOSPADM

## 2018-12-21 RX ORDER — MIDAZOLAM HYDROCHLORIDE 1 MG/ML
2 INJECTION INTRAMUSCULAR; INTRAVENOUS
Status: DISCONTINUED | OUTPATIENT
Start: 2018-12-21 | End: 2018-12-21 | Stop reason: HOSPADM

## 2018-12-21 RX ORDER — HYDROMORPHONE HYDROCHLORIDE 1 MG/ML
0.5 INJECTION, SOLUTION INTRAMUSCULAR; INTRAVENOUS; SUBCUTANEOUS
Status: DISCONTINUED | OUTPATIENT
Start: 2018-12-21 | End: 2018-12-21 | Stop reason: HOSPADM

## 2018-12-21 RX ORDER — ONDANSETRON 2 MG/ML
INJECTION INTRAMUSCULAR; INTRAVENOUS AS NEEDED
Status: DISCONTINUED | OUTPATIENT
Start: 2018-12-21 | End: 2018-12-21 | Stop reason: SURG

## 2018-12-21 RX ADMIN — SODIUM CHLORIDE, POTASSIUM CHLORIDE, SODIUM LACTATE AND CALCIUM CHLORIDE 9 ML/HR: 600; 310; 30; 20 INJECTION, SOLUTION INTRAVENOUS at 12:05

## 2018-12-21 RX ADMIN — ONDANSETRON 4 MG: 2 INJECTION INTRAMUSCULAR; INTRAVENOUS at 13:05

## 2018-12-21 RX ADMIN — LIDOCAINE HYDROCHLORIDE 60 MG: 20 INJECTION, SOLUTION INFILTRATION; PERINEURAL at 13:00

## 2018-12-21 RX ADMIN — DEXAMETHASONE SODIUM PHOSPHATE 4 MG: 10 INJECTION INTRAMUSCULAR; INTRAVENOUS at 13:05

## 2018-12-21 RX ADMIN — PROPOFOL 200 MG: 10 INJECTION, EMULSION INTRAVENOUS at 13:00

## 2018-12-21 RX ADMIN — LIDOCAINE HYDROCHLORIDE 0.5 ML: 10 INJECTION, SOLUTION EPIDURAL; INFILTRATION; INTRACAUDAL; PERINEURAL at 12:05

## 2018-12-21 RX ADMIN — FAMOTIDINE 20 MG: 10 INJECTION, SOLUTION INTRAVENOUS at 12:22

## 2018-12-21 RX ADMIN — MIDAZOLAM HYDROCHLORIDE 2 MG: 2 INJECTION, SOLUTION INTRAMUSCULAR; INTRAVENOUS at 12:22

## 2018-12-21 RX ADMIN — CEFAZOLIN SODIUM 2 G: 2 INJECTION, SOLUTION INTRAVENOUS at 13:02

## 2018-12-21 NOTE — ANESTHESIA PREPROCEDURE EVALUATION
Anesthesia Evaluation     Patient summary reviewed and Nursing notes reviewed   NPO Solid Status: > 8 hours  NPO Liquid Status: > 2 hours           Airway   Mallampati: II  no difficulty expected  Dental - normal exam     Pulmonary     breath sounds clear to auscultation  (+) a smoker Former,   Cardiovascular     ECG reviewed  Rhythm: regular  Rate: normal    (+) hypertension, hyperlipidemia,       Neuro/Psych  GI/Hepatic/Renal/Endo    (+) obesity,   renal disease stones,     Musculoskeletal     Abdominal    Substance History      OB/GYN          Other      history of cancer                    Anesthesia Plan    ASA 3     general     intravenous induction   Anesthetic plan, all risks, benefits, and alternatives have been provided, discussed and informed consent has been obtained with: patient.

## 2018-12-21 NOTE — ANESTHESIA POSTPROCEDURE EVALUATION
Patient: Christiano Sandoval    Procedure Summary     Date:  12/21/18 Room / Location:   [facility]    Anesthesia Start:  1255 Anesthesia Stop:  1340    Procedure:  RIGHT EXTRACORPOREAL SHOCKWAVE LITHOTRIPSY (Right ) Diagnosis:      Surgeon:  Yehuda Lindsay MD Provider:  Kevin Crawford MD    Anesthesia Type:  general ASA Status:  3          Anesthesia Type: general  Last vitals  BP   164/92 (12/21/18 1405)   Temp   37 °C (98.6 °F) (12/21/18 1338)   Pulse   56 (12/21/18 1405)   Resp   16 (12/21/18 1405)     SpO2   97 % (12/21/18 1405)     Post Anesthesia Care and Evaluation    Patient location during evaluation: bedside  Patient participation: complete - patient participated  Level of consciousness: awake and alert  Pain management: adequate  Airway patency: patent  Anesthetic complications: No anesthetic complications    Cardiovascular status: acceptable  Respiratory status: acceptable  Hydration status: acceptable    Comments: /92   Pulse 56   Temp 37 °C (98.6 °F) (Tympanic)   Resp 16   SpO2 97%

## 2018-12-21 NOTE — BRIEF OP NOTE
EXTRACORPOREAL SHOCKWAVE LITHOTRIPSY  Progress Note    Christiano Sandoval  12/21/2018    Pre-op Diagnosis:   Rt renal stone pain        Post-Op Diagnosis Codes:   same    Procedure/CPT® Codes:      Procedure(s):  RIGHT EXTRACORPOREAL SHOCKWAVE LITHOTRIPSY    Surgeon(s):  Yehuda Lindsay MD    Anesthesia: General    Staff:   Circulator: Sarika Merritt RN  Scrub Person: Magui Garcia  Vendor Representative: Ellis, Duane    Estimated Blood Loss: minimal    Urine Voided: * No values recorded between 12/21/2018 12:55 PM and 12/21/2018  1:33 PM *    Specimens:                None      Drains:      Findings:fragmented     Complications:       Yehuda Lindsay MD     Date: 12/21/2018  Time: 1:33 PM

## 2018-12-21 NOTE — H&P
Patient Care Team:  Ming Thompson MD as PCP - General (Family Medicine)    Chief complaint rt kidney stone pain      Subjective     Patient is a 68 y.o. male presents with  rec caox stones  Large rt renal stone pain 8/10   Nausea no f/c  Prior eswl partial frag,mentation       Review of Systems 12 POINT NEG ROS PERTINENT IN HPI        Past Medical History:   Diagnosis Date   • Cancer (CMS/HCC)     PROSTATE   • Hard of hearing     BILATERAL   • History of hepatitis B    • Hyperlipidemia    • Hypertension    • Kidney stone      Past Surgical History:   Procedure Laterality Date   • COLONOSCOPY     • EXTRACORPOREAL SHOCK WAVE LITHOTRIPSY (ESWL) Right 2018    Procedure: RIGHT EXTRACORPOREAL SHOCKWAVE LITHOTRIPSY;  Surgeon: Kevin Manzano MD;  Location: [facility] [place];  Service: Urology   • EYE SURGERY     • HEMORRHOIDECTOMY       Family History   Problem Relation Age of Onset   • Malig Hyperthermia Neg Hx      Social History     Tobacco Use   • Smoking status: Former Smoker     Packs/day: 0.50     Types: Cigarettes     Last attempt to quit: 1979     Years since quittin.9   • Smokeless tobacco: Never Used   Substance Use Topics   • Alcohol use: Yes     Alcohol/week: 1.8 oz     Types: 3 Cans of beer per week     Comment: DAILY   • Drug use: No     Medications Prior to Admission   Medication Sig Dispense Refill Last Dose   • aspirin 81 MG EC tablet Take 81 mg by mouth Daily. HOLD FOR SURGERY   12/15/2018   • atorvastatin (LIPITOR) 10 MG tablet Take 10 mg by mouth Daily.   2018   • Multiple Vitamins-Minerals (MULTIVITAL PO) Take 1 tablet by mouth Daily. HOLD FOR SURGERY   12/15/2018   • sildenafil (REVATIO) 20 MG tablet Take 10 mg by mouth Daily.   2018     Allergies:  Patient has no known allergies.    Objective     Vital Signs  Temp:  [97.8 °F (36.6 °C)-98.6 °F (37 °C)] 98.6 °F (37 °C)  Heart Rate:  [50-61] 51  Resp:  [14-16] 16  BP: (147-164)/(80-92) 150/80    Intake/Output Summary (Last 24  hours) at 12/21/2018 1400  Last data filed at 12/21/2018 1340  Gross per 24 hour   Intake 700 ml   Output --   Net 700 ml          Physical Exam:      General Appearance:    Alert, cooperative, in no acute distress   Head:    Normocephalic, without obvious abnormality, atraumatic   Eyes:            Lids and lashes normal, conjunctivae and sclerae normal, no   icterus, no pallor, corneas clear,   Ears:    Ears appear intact with no abnormalities noted   Throat:   No oral lesions, no thrush, oral mucosa moist   Neck:   No adenopathy, supple, trachea midline, no thyromegaly, no JVD   Back:     No kyphosis present, no scoliosis present, no skin lesions,      erythema or scars,rt cva tenderness to percussion or                   palpation,   range of motion normal   Lungs:     respirations regular, even and                  unlabored    Heart:    Regular rhythm and normal rate,   Chest Wall:    No abnormalities observed   Abdomen:     Normal bowel sounds, no masses, no organomegaly, soft        non-tender, non-distended, no guarding, no rebound                tenderness   Rectal:     Deferred   Extremities:   Moves all extremities well, no edema, no cyanosis, no             redness   Pulses:   Pulses palpable and equal bilaterally   Skin:   No bleeding, bruising or rash   Lymph nodes:   No palpable adenopathy   Neurologic:          Debilities/Disabilities Identified: None    Emotional Behavior: Appropriate    Results Review:    I reviewed the patient's new clinical results.  Lab Results (all)     None          Imaging Results (all)     None          Assessment/Plan       * No active hospital problems. *       Right renal stone colic  Plan rt eswl  Poss stent p\rocedure discussed with pt inc risks not limited to infection  bleeding  Pain ancillary procedures      I discussed the patients findings and my recommendations with patient.     Yehuda Lindsay MD  12/21/18  2:00 PM

## 2018-12-21 NOTE — ANESTHESIA PROCEDURE NOTES
ANESTHESIA INTUBATION  Urgency: elective      General Information and Staff    Patient location during procedure: OR  Anesthesiologist: Ty, Kevin Godoy MD  CRNA: Nicho Perez CRNA    Indications and Patient Condition  Indications for airway management: airway protection    Preoxygenated: yes  MILS maintained throughout      Final Airway Details  Final airway type: supraglottic airway      Successful airway: unique  Size 5    Number of attempts at approach: 1    Additional Comments  Teeth checked, no damage. Atraumatic.

## 2018-12-21 NOTE — OP NOTE
Preoperative diagnosis large right renal calculus colic    Same postoperative diagnosis    Operative procedure right distal tibial Stortz modulus 3000 shocks 24 KUB with pause protocol    Surgeon Neftali    Anesthesia general    Procedure note 68-year-old with calcium oxalate monohydrate stone partial fragmentation the first time to undergo repeat procedure procedure tenderness were discussed understood agree proceed    Site was marked antibiotics were given timeout was taken after adequate general anesthesia is placed very carefully to ESWL shock head re-was coupled initially started out of the shock rate of 18 KV at 60/m increasing after area 150 shocks and positing 120 shocks per minute the maximum 24 KV was 20.  The excellent fragmentation procedure well was sent to the recovery room in satisfactory condition discharged with Percocet Zofran and office follow-up in 2-3 weeks with KUB follow-up answering service was left a message with

## 2019-01-09 ENCOUNTER — TRANSCRIBE ORDERS (OUTPATIENT)
Dept: ADMINISTRATIVE | Facility: HOSPITAL | Age: 69
End: 2019-01-09

## 2019-01-09 ENCOUNTER — HOSPITAL ENCOUNTER (OUTPATIENT)
Dept: GENERAL RADIOLOGY | Facility: HOSPITAL | Age: 69
Discharge: HOME | End: 2019-01-09
Attending: UROLOGY | Admitting: UROLOGY

## 2019-01-09 DIAGNOSIS — N20.0 RENAL CALCULUS: Primary | ICD-10-CM

## 2019-01-09 DIAGNOSIS — N20.0 RENAL CALCULUS: ICD-10-CM

## 2019-01-09 PROCEDURE — 74018 RADEX ABDOMEN 1 VIEW: CPT

## 2019-02-19 ENCOUNTER — HOSPITAL ENCOUNTER (OUTPATIENT)
Dept: GENERAL RADIOLOGY | Facility: HOSPITAL | Age: 69
Discharge: HOME | End: 2019-02-19
Admitting: UROLOGY

## 2019-02-19 ENCOUNTER — TRANSCRIBE ORDERS (OUTPATIENT)
Dept: ADMINISTRATIVE | Facility: HOSPITAL | Age: 69
End: 2019-02-19

## 2019-02-19 DIAGNOSIS — N20.0 CALCULUS, RENAL: ICD-10-CM

## 2019-02-19 DIAGNOSIS — N20.0 CALCULUS, RENAL: Primary | ICD-10-CM

## 2019-02-19 PROCEDURE — 74018 RADEX ABDOMEN 1 VIEW: CPT

## 2019-09-12 ENCOUNTER — TRANSCRIBE ORDERS (OUTPATIENT)
Dept: ADMINISTRATIVE | Facility: HOSPITAL | Age: 69
End: 2019-09-12

## 2019-09-12 DIAGNOSIS — C61 MALIGNANT NEOPLASM OF PROSTATE (HCC): Primary | ICD-10-CM

## 2019-09-26 ENCOUNTER — HOSPITAL ENCOUNTER (OUTPATIENT)
Dept: MRI IMAGING | Facility: HOSPITAL | Age: 69
Discharge: HOME | End: 2019-09-26
Admitting: UROLOGY

## 2019-09-26 DIAGNOSIS — C61 MALIGNANT NEOPLASM OF PROSTATE (HCC): ICD-10-CM

## 2019-09-26 LAB — CREAT BLDA-MCNC: 0.9 MG/DL (ref 0.6–1.3)

## 2019-09-26 PROCEDURE — 82565 ASSAY OF CREATININE: CPT

## 2019-09-26 PROCEDURE — 72197 MRI PELVIS W/O & W/DYE: CPT

## 2019-09-26 PROCEDURE — 0 GADOBENATE DIMEGLUMINE 529 MG/ML SOLUTION: Performed by: UROLOGY

## 2019-09-26 PROCEDURE — A9577 INJ MULTIHANCE: HCPCS | Performed by: UROLOGY

## 2019-09-26 RX ADMIN — GADOBENATE DIMEGLUMINE 18 ML: 529 INJECTION, SOLUTION INTRAVENOUS at 12:20

## 2019-10-03 ENCOUNTER — HOSPITAL ENCOUNTER (OUTPATIENT)
Dept: PET IMAGING | Facility: HOSPITAL | Age: 69
Discharge: HOME | End: 2019-10-03
Admitting: UROLOGY

## 2019-10-03 DIAGNOSIS — C61 MALIGNANT NEOPLASM OF PROSTATE (HCC): ICD-10-CM

## 2019-10-03 PROCEDURE — A9588 FLUCICLOVINE F-18: HCPCS | Performed by: UROLOGY

## 2019-10-03 PROCEDURE — 78815 PET IMAGE W/CT SKULL-THIGH: CPT

## 2019-10-03 PROCEDURE — 0 FLUCICLOVINE: Performed by: UROLOGY

## 2019-10-03 RX ADMIN — FLUCICLOVINE F-18 1 DOSE: 221 INJECTION, SOLUTION INTRAVENOUS at 13:22

## 2019-12-05 ENCOUNTER — LAB (OUTPATIENT)
Dept: LAB | Facility: HOSPITAL | Age: 69
End: 2019-12-05

## 2019-12-05 ENCOUNTER — HOSPITAL ENCOUNTER (OUTPATIENT)
Dept: GENERAL RADIOLOGY | Facility: HOSPITAL | Age: 69
Discharge: HOME | End: 2019-12-05
Admitting: UROLOGY

## 2019-12-05 ENCOUNTER — TRANSCRIBE ORDERS (OUTPATIENT)
Dept: ADMINISTRATIVE | Facility: HOSPITAL | Age: 69
End: 2019-12-05

## 2019-12-05 DIAGNOSIS — C61 MALIGNANT NEOPLASM OF PROSTATE (HCC): ICD-10-CM

## 2019-12-05 DIAGNOSIS — N20.0 CALCULUS, RENAL: ICD-10-CM

## 2019-12-05 DIAGNOSIS — R97.20 ELEVATED PROSTATE SPECIFIC ANTIGEN (PSA): ICD-10-CM

## 2019-12-05 DIAGNOSIS — N20.0 CALCULUS, RENAL: Primary | ICD-10-CM

## 2019-12-05 PROCEDURE — 74018 RADEX ABDOMEN 1 VIEW: CPT

## 2019-12-05 PROCEDURE — 36415 COLL VENOUS BLD VENIPUNCTURE: CPT

## 2019-12-05 PROCEDURE — 84153 ASSAY OF PSA TOTAL: CPT

## 2019-12-05 PROCEDURE — 84154 ASSAY OF PSA FREE: CPT

## 2019-12-06 LAB
PSA FREE MFR SERPL: 9.6 %
PSA FREE SERPL-MCNC: 0.23 NG/ML
PSA SERPL-MCNC: 2.4 NG/ML (ref 0–4)

## 2020-03-18 ENCOUNTER — TRANSCRIBE ORDERS (OUTPATIENT)
Dept: ADMINISTRATIVE | Facility: HOSPITAL | Age: 70
End: 2020-03-18

## 2020-03-18 DIAGNOSIS — R91.1 LUNG NODULE: Primary | ICD-10-CM

## 2020-03-24 ENCOUNTER — HOSPITAL ENCOUNTER (OUTPATIENT)
Dept: CT IMAGING | Facility: HOSPITAL | Age: 70
Discharge: HOME | End: 2020-03-24

## 2020-04-29 ENCOUNTER — HOSPITAL ENCOUNTER (OUTPATIENT)
Dept: CT IMAGING | Facility: HOSPITAL | Age: 70
Discharge: HOME | End: 2020-04-29

## 2020-05-07 ENCOUNTER — HOSPITAL ENCOUNTER (OUTPATIENT)
Dept: CT IMAGING | Facility: HOSPITAL | Age: 70
Discharge: HOME | End: 2020-05-07
Admitting: FAMILY MEDICINE

## 2020-05-07 DIAGNOSIS — R91.1 LUNG NODULE: ICD-10-CM

## 2020-05-07 PROCEDURE — 71250 CT THORAX DX C-: CPT

## 2020-08-25 ENCOUNTER — TRANSCRIBE ORDERS (OUTPATIENT)
Dept: ADMINISTRATIVE | Facility: HOSPITAL | Age: 70
End: 2020-08-25

## 2020-08-25 DIAGNOSIS — C61 PROSTATE CANCER (HCC): Primary | ICD-10-CM

## 2020-09-17 ENCOUNTER — TRANSCRIBE ORDERS (OUTPATIENT)
Dept: ADMINISTRATIVE | Facility: HOSPITAL | Age: 70
End: 2020-09-17

## 2020-09-17 ENCOUNTER — HOSPITAL ENCOUNTER (OUTPATIENT)
Dept: PET IMAGING | Facility: HOSPITAL | Age: 70
Discharge: HOME | End: 2020-09-17

## 2020-09-17 ENCOUNTER — HOSPITAL ENCOUNTER (OUTPATIENT)
Dept: GENERAL RADIOLOGY | Facility: HOSPITAL | Age: 70
Discharge: HOME | End: 2020-09-17

## 2020-09-17 DIAGNOSIS — C61 CANCER DETERMINED BY PROSTATE BIOPSY (HCC): Primary | ICD-10-CM

## 2020-09-17 DIAGNOSIS — C61 CANCER DETERMINED BY PROSTATE BIOPSY (HCC): ICD-10-CM

## 2020-09-17 DIAGNOSIS — C61 PROSTATE CANCER (HCC): ICD-10-CM

## 2020-09-17 PROCEDURE — 0 FLUCICLOVINE: Performed by: UROLOGY

## 2020-09-17 PROCEDURE — 78815 PET IMAGE W/CT SKULL-THIGH: CPT

## 2020-09-17 PROCEDURE — 74018 RADEX ABDOMEN 1 VIEW: CPT

## 2020-09-17 PROCEDURE — A9588 FLUCICLOVINE F-18: HCPCS | Performed by: UROLOGY

## 2020-09-17 RX ADMIN — FLUCICLOVINE F-18 1 DOSE: 221 INJECTION, SOLUTION INTRAVENOUS at 12:21

## 2020-09-24 ENCOUNTER — TRANSCRIBE ORDERS (OUTPATIENT)
Dept: ADMINISTRATIVE | Facility: HOSPITAL | Age: 70
End: 2020-09-24

## 2020-09-24 DIAGNOSIS — C61 MALIGNANT NEOPLASM OF PROSTATE (HCC): Primary | ICD-10-CM

## 2020-10-08 ENCOUNTER — HOSPITAL ENCOUNTER (OUTPATIENT)
Dept: MRI IMAGING | Facility: HOSPITAL | Age: 70
Discharge: HOME | End: 2020-10-08
Admitting: UROLOGY

## 2020-10-08 DIAGNOSIS — C61 MALIGNANT NEOPLASM OF PROSTATE (HCC): ICD-10-CM

## 2020-10-08 PROCEDURE — 72197 MRI PELVIS W/O & W/DYE: CPT

## 2020-10-08 PROCEDURE — 0 GADOBENATE DIMEGLUMINE 529 MG/ML SOLUTION: Performed by: UROLOGY

## 2020-10-08 PROCEDURE — 82565 ASSAY OF CREATININE: CPT

## 2020-10-08 PROCEDURE — A9577 INJ MULTIHANCE: HCPCS | Performed by: UROLOGY

## 2020-10-08 RX ADMIN — GADOBENATE DIMEGLUMINE 19 ML: 529 INJECTION, SOLUTION INTRAVENOUS at 19:25

## 2020-10-09 LAB — CREAT BLDA-MCNC: 1.1 MG/DL (ref 0.6–1.3)

## 2021-05-26 ENCOUNTER — TRANSCRIBE ORDERS (OUTPATIENT)
Dept: ADMINISTRATIVE | Facility: HOSPITAL | Age: 71
End: 2021-05-26

## 2021-05-26 DIAGNOSIS — C61 PROSTATE CANCER (HCC): Primary | ICD-10-CM

## 2021-08-10 ENCOUNTER — HOSPITAL ENCOUNTER (OUTPATIENT)
Dept: PET IMAGING | Facility: HOSPITAL | Age: 71
Discharge: HOME | End: 2021-08-10

## 2021-08-10 ENCOUNTER — HOSPITAL ENCOUNTER (OUTPATIENT)
Dept: GENERAL RADIOLOGY | Facility: HOSPITAL | Age: 71
Discharge: HOME | End: 2021-08-10

## 2021-08-10 DIAGNOSIS — N20.0 CALCULUS, RENAL: ICD-10-CM

## 2021-08-10 DIAGNOSIS — C61 PROSTATE CANCER (HCC): ICD-10-CM

## 2021-08-10 PROCEDURE — 74018 RADEX ABDOMEN 1 VIEW: CPT

## 2021-08-10 PROCEDURE — 78815 PET IMAGE W/CT SKULL-THIGH: CPT

## 2021-08-10 PROCEDURE — 0 FLUCICLOVINE: Performed by: UROLOGY

## 2021-08-10 PROCEDURE — A9588 FLUCICLOVINE F-18: HCPCS | Performed by: UROLOGY

## 2021-08-10 RX ADMIN — FLUCICLOVINE F-18 1 DOSE: 221 INJECTION, SOLUTION INTRAVENOUS at 12:01

## 2022-03-03 ENCOUNTER — TRANSCRIBE ORDERS (OUTPATIENT)
Dept: ADMINISTRATIVE | Facility: HOSPITAL | Age: 72
End: 2022-03-03

## 2022-03-03 DIAGNOSIS — R97.21 RISING PSA FOLLOWING TREATMENT FOR MALIGNANT NEOPLASM OF PROSTATE: Primary | ICD-10-CM

## 2022-04-06 ENCOUNTER — HOSPITAL ENCOUNTER (OUTPATIENT)
Dept: PET IMAGING | Facility: HOSPITAL | Age: 72
Discharge: HOME | End: 2022-04-06

## 2022-04-06 DIAGNOSIS — R97.21 RISING PSA FOLLOWING TREATMENT FOR MALIGNANT NEOPLASM OF PROSTATE: ICD-10-CM

## 2022-04-06 PROCEDURE — 0 PIFLUFOLASTAT F 18 9 MCI SOLUTION PREFILLED SYRINGE: Performed by: UROLOGY

## 2022-04-06 PROCEDURE — A9595 PIFLUFOLASTAT F 18 9 MCI SOLUTION PREFILLED SYRINGE: HCPCS | Performed by: UROLOGY

## 2022-04-06 PROCEDURE — 74176 CT ABD & PELVIS W/O CONTRAST: CPT

## 2022-04-06 PROCEDURE — 78815 PET IMAGE W/CT SKULL-THIGH: CPT

## 2022-04-06 RX ADMIN — PIFLUFOLASTAT F-18 9.8 MILLICURIE: 80 INJECTION INTRAVENOUS at 12:44

## 2022-11-11 ENCOUNTER — TRANSCRIBE ORDERS (OUTPATIENT)
Dept: ADMINISTRATIVE | Facility: HOSPITAL | Age: 72
End: 2022-11-11

## 2022-11-11 DIAGNOSIS — N20.0 CALCULUS, RENAL: Primary | ICD-10-CM

## 2023-05-04 ENCOUNTER — HOSPITAL ENCOUNTER (OUTPATIENT)
Dept: ULTRASOUND IMAGING | Facility: HOSPITAL | Age: 73
Discharge: HOME | End: 2023-05-04
Payer: MEDICARE

## 2023-05-04 DIAGNOSIS — N20.0 CALCULUS, RENAL: ICD-10-CM

## 2023-05-04 PROCEDURE — 76775 US EXAM ABDO BACK WALL LIM: CPT

## 2023-05-05 ENCOUNTER — LAB (OUTPATIENT)
Dept: LAB | Facility: HOSPITAL | Age: 73
End: 2023-05-05
Payer: MEDICARE

## 2023-05-05 ENCOUNTER — TRANSCRIBE ORDERS (OUTPATIENT)
Dept: ADMINISTRATIVE | Facility: HOSPITAL | Age: 73
End: 2023-05-05
Payer: MEDICARE

## 2023-05-05 ENCOUNTER — HOSPITAL ENCOUNTER (OUTPATIENT)
Dept: GENERAL RADIOLOGY | Facility: HOSPITAL | Age: 73
Discharge: HOME | End: 2023-05-05
Payer: MEDICARE

## 2023-05-05 DIAGNOSIS — N20.0 CALCULUS, RENAL: Primary | ICD-10-CM

## 2023-05-05 DIAGNOSIS — C61 MALIGNANT NEOPLASM OF PROSTATE: ICD-10-CM

## 2023-05-05 DIAGNOSIS — C61 MALIGNANT NEOPLASM OF PROSTATE: Primary | ICD-10-CM

## 2023-05-05 DIAGNOSIS — N20.0 CALCULUS, RENAL: ICD-10-CM

## 2023-05-05 PROCEDURE — 84154 ASSAY OF PSA FREE: CPT

## 2023-05-05 PROCEDURE — 84153 ASSAY OF PSA TOTAL: CPT

## 2023-05-05 PROCEDURE — 74018 RADEX ABDOMEN 1 VIEW: CPT

## 2023-05-05 PROCEDURE — 36415 COLL VENOUS BLD VENIPUNCTURE: CPT

## 2023-05-06 LAB
PSA FREE MFR SERPL: 7.5 %
PSA FREE SERPL-MCNC: 0.44 NG/ML
PSA SERPL-MCNC: 5.9 NG/ML (ref 0–4)

## 2023-11-01 ENCOUNTER — TRANSCRIBE ORDERS (OUTPATIENT)
Dept: ADMINISTRATIVE | Facility: HOSPITAL | Age: 73
End: 2023-11-01
Payer: MEDICARE

## 2023-11-01 DIAGNOSIS — N20.0 CALCULUS, RENAL: Primary | ICD-10-CM

## 2023-11-09 ENCOUNTER — HOSPITAL ENCOUNTER (OUTPATIENT)
Dept: ULTRASOUND IMAGING | Facility: HOSPITAL | Age: 73
Discharge: HOME | End: 2023-11-09
Payer: MEDICARE

## 2023-11-09 ENCOUNTER — TRANSCRIBE ORDERS (OUTPATIENT)
Dept: ADMINISTRATIVE | Facility: HOSPITAL | Age: 73
End: 2023-11-09
Payer: MEDICARE

## 2023-11-09 ENCOUNTER — LAB (OUTPATIENT)
Dept: LAB | Facility: HOSPITAL | Age: 73
End: 2023-11-09
Payer: MEDICARE

## 2023-11-09 ENCOUNTER — HOSPITAL ENCOUNTER (OUTPATIENT)
Dept: GENERAL RADIOLOGY | Facility: HOSPITAL | Age: 73
Discharge: HOME | End: 2023-11-09
Payer: MEDICARE

## 2023-11-09 DIAGNOSIS — N20.0 CALCULUS, RENAL: ICD-10-CM

## 2023-11-09 DIAGNOSIS — C61 MALIGNANT NEOPLASM OF PROSTATE: Primary | ICD-10-CM

## 2023-11-09 DIAGNOSIS — C61 MALIGNANT NEOPLASM OF PROSTATE: ICD-10-CM

## 2023-11-09 PROCEDURE — 84153 ASSAY OF PSA TOTAL: CPT

## 2023-11-09 PROCEDURE — 36415 COLL VENOUS BLD VENIPUNCTURE: CPT

## 2023-11-09 PROCEDURE — 76775 US EXAM ABDO BACK WALL LIM: CPT

## 2023-11-09 PROCEDURE — 74018 RADEX ABDOMEN 1 VIEW: CPT

## 2023-11-09 PROCEDURE — 84154 ASSAY OF PSA FREE: CPT

## 2023-11-10 LAB
PSA FREE MFR SERPL: 5.8 %
PSA FREE SERPL-MCNC: 0.43 NG/ML
PSA SERPL-MCNC: 7.4 NG/ML (ref 0–4)

## 2024-01-22 ENCOUNTER — LAB (OUTPATIENT)
Dept: LAB | Facility: HOSPITAL | Age: 74
End: 2024-01-22
Payer: MEDICARE

## 2024-01-22 ENCOUNTER — TRANSCRIBE ORDERS (OUTPATIENT)
Dept: ADMINISTRATIVE | Facility: HOSPITAL | Age: 74
End: 2024-01-22
Payer: MEDICARE

## 2024-01-22 DIAGNOSIS — C61 MALIGNANT NEOPLASM OF PROSTATE: ICD-10-CM

## 2024-01-22 DIAGNOSIS — C61 MALIGNANT NEOPLASM OF PROSTATE: Primary | ICD-10-CM

## 2024-01-22 LAB — TESTOST SERPL-MCNC: 550 NG/DL (ref 193–740)

## 2024-01-22 PROCEDURE — 36415 COLL VENOUS BLD VENIPUNCTURE: CPT

## 2024-01-22 PROCEDURE — 84403 ASSAY OF TOTAL TESTOSTERONE: CPT

## 2024-02-29 ENCOUNTER — HOSPITAL ENCOUNTER (EMERGENCY)
Facility: HOSPITAL | Age: 74
Discharge: HOME | End: 2024-02-29
Attending: EMERGENCY MEDICINE | Admitting: EMERGENCY MEDICINE
Payer: MEDICARE

## 2024-02-29 VITALS
HEIGHT: 67 IN | OXYGEN SATURATION: 93 % | HEART RATE: 87 BPM | DIASTOLIC BLOOD PRESSURE: 94 MMHG | SYSTOLIC BLOOD PRESSURE: 133 MMHG | RESPIRATION RATE: 20 BRPM | BODY MASS INDEX: 31.39 KG/M2 | WEIGHT: 200 LBS | TEMPERATURE: 99.1 F

## 2024-02-29 DIAGNOSIS — A08.4 VIRAL GASTROENTERITIS: Primary | ICD-10-CM

## 2024-02-29 LAB
ALBUMIN SERPL-MCNC: 3.7 G/DL (ref 3.5–5.2)
ALBUMIN/GLOB SERPL: 1.3 G/DL
ALP SERPL-CCNC: 68 U/L (ref 39–117)
ALT SERPL W P-5'-P-CCNC: 15 U/L (ref 1–41)
ANION GAP SERPL CALCULATED.3IONS-SCNC: 12.3 MMOL/L (ref 5–15)
AST SERPL-CCNC: 16 U/L (ref 1–40)
BACTERIA UR QL AUTO: ABNORMAL /HPF
BASOPHILS # BLD AUTO: 0.03 10*3/MM3 (ref 0–0.2)
BASOPHILS NFR BLD AUTO: 0.5 % (ref 0–1.5)
BILIRUB SERPL-MCNC: 0.7 MG/DL (ref 0–1.2)
BILIRUB UR QL STRIP: ABNORMAL
BUN SERPL-MCNC: 15 MG/DL (ref 8–23)
BUN/CREAT SERPL: 13.4 (ref 7–25)
CALCIUM SPEC-SCNC: 8.2 MG/DL (ref 8.6–10.5)
CHLORIDE SERPL-SCNC: 98 MMOL/L (ref 98–107)
CLARITY UR: CLEAR
CO2 SERPL-SCNC: 22.7 MMOL/L (ref 22–29)
COLOR UR: YELLOW
CREAT SERPL-MCNC: 1.12 MG/DL (ref 0.76–1.27)
DEPRECATED RDW RBC AUTO: 44.6 FL (ref 37–54)
EGFRCR SERPLBLD CKD-EPI 2021: 68.5 ML/MIN/1.73
EOSINOPHIL # BLD AUTO: 0 10*3/MM3 (ref 0–0.4)
EOSINOPHIL NFR BLD AUTO: 0 % (ref 0.3–6.2)
ERYTHROCYTE [DISTWIDTH] IN BLOOD BY AUTOMATED COUNT: 13 % (ref 12.3–15.4)
GLOBULIN UR ELPH-MCNC: 2.8 GM/DL
GLUCOSE SERPL-MCNC: 141 MG/DL (ref 65–99)
GLUCOSE UR STRIP-MCNC: NEGATIVE MG/DL
HCT VFR BLD AUTO: 43.5 % (ref 37.5–51)
HGB BLD-MCNC: 15.1 G/DL (ref 13–17.7)
HGB UR QL STRIP.AUTO: ABNORMAL
HYALINE CASTS UR QL AUTO: ABNORMAL /LPF
IMM GRANULOCYTES # BLD AUTO: 0.02 10*3/MM3 (ref 0–0.05)
IMM GRANULOCYTES NFR BLD AUTO: 0.3 % (ref 0–0.5)
KETONES UR QL STRIP: ABNORMAL
LEUKOCYTE ESTERASE UR QL STRIP.AUTO: NEGATIVE
LIPASE SERPL-CCNC: 21 U/L (ref 13–60)
LYMPHOCYTES # BLD AUTO: 0.66 10*3/MM3 (ref 0.7–3.1)
LYMPHOCYTES NFR BLD AUTO: 10.3 % (ref 19.6–45.3)
MCH RBC QN AUTO: 32.8 PG (ref 26.6–33)
MCHC RBC AUTO-ENTMCNC: 34.7 G/DL (ref 31.5–35.7)
MCV RBC AUTO: 94.6 FL (ref 79–97)
MONOCYTES # BLD AUTO: 0.4 10*3/MM3 (ref 0.1–0.9)
MONOCYTES NFR BLD AUTO: 6.2 % (ref 5–12)
NEUTROPHILS NFR BLD AUTO: 5.31 10*3/MM3 (ref 1.7–7)
NEUTROPHILS NFR BLD AUTO: 82.7 % (ref 42.7–76)
NITRITE UR QL STRIP: NEGATIVE
NRBC BLD AUTO-RTO: 0 /100 WBC (ref 0–0.2)
PH UR STRIP.AUTO: 5.5 [PH] (ref 4.5–8)
PLATELET # BLD AUTO: 134 10*3/MM3 (ref 140–450)
PMV BLD AUTO: 9.7 FL (ref 6–12)
POTASSIUM SERPL-SCNC: 3.6 MMOL/L (ref 3.5–5.2)
PROT SERPL-MCNC: 6.5 G/DL (ref 6–8.5)
PROT UR QL STRIP: ABNORMAL
RBC # BLD AUTO: 4.6 10*6/MM3 (ref 4.14–5.8)
RBC # UR STRIP: ABNORMAL /HPF
RBC MORPH BLD: NORMAL
REF LAB TEST METHOD: ABNORMAL
SMALL PLATELETS BLD QL SMEAR: NORMAL
SODIUM SERPL-SCNC: 133 MMOL/L (ref 136–145)
SP GR UR STRIP: 1.03 (ref 1–1.03)
SQUAMOUS #/AREA URNS HPF: ABNORMAL /HPF
UROBILINOGEN UR QL STRIP: ABNORMAL
WBC # UR STRIP: ABNORMAL /HPF
WBC MORPH BLD: NORMAL
WBC NRBC COR # BLD AUTO: 6.42 10*3/MM3 (ref 3.4–10.8)

## 2024-02-29 PROCEDURE — 25810000003 SODIUM CHLORIDE 0.9 % SOLUTION: Performed by: EMERGENCY MEDICINE

## 2024-02-29 PROCEDURE — 81001 URINALYSIS AUTO W/SCOPE: CPT | Performed by: EMERGENCY MEDICINE

## 2024-02-29 PROCEDURE — 85007 BL SMEAR W/DIFF WBC COUNT: CPT | Performed by: EMERGENCY MEDICINE

## 2024-02-29 PROCEDURE — 85025 COMPLETE CBC W/AUTO DIFF WBC: CPT | Performed by: EMERGENCY MEDICINE

## 2024-02-29 PROCEDURE — 99283 EMERGENCY DEPT VISIT LOW MDM: CPT

## 2024-02-29 PROCEDURE — 83690 ASSAY OF LIPASE: CPT | Performed by: EMERGENCY MEDICINE

## 2024-02-29 PROCEDURE — 80053 COMPREHEN METABOLIC PANEL: CPT | Performed by: EMERGENCY MEDICINE

## 2024-02-29 PROCEDURE — 25010000002 ONDANSETRON PER 1 MG: Performed by: EMERGENCY MEDICINE

## 2024-02-29 PROCEDURE — 96374 THER/PROPH/DIAG INJ IV PUSH: CPT

## 2024-02-29 RX ORDER — ONDANSETRON 8 MG/1
TABLET, ORALLY DISINTEGRATING ORAL
Qty: 10 TABLET | Refills: 0 | Status: SHIPPED | OUTPATIENT
Start: 2024-02-29

## 2024-02-29 RX ORDER — DIPHENOXYLATE HYDROCHLORIDE AND ATROPINE SULFATE 2.5; .025 MG/1; MG/1
2 TABLET ORAL ONCE
Status: COMPLETED | OUTPATIENT
Start: 2024-02-29 | End: 2024-02-29

## 2024-02-29 RX ORDER — DIPHENOXYLATE HYDROCHLORIDE AND ATROPINE SULFATE 2.5; .025 MG/1; MG/1
TABLET ORAL
Qty: 24 TABLET | Refills: 0 | Status: SHIPPED | OUTPATIENT
Start: 2024-02-29

## 2024-02-29 RX ORDER — SODIUM CHLORIDE 0.9 % (FLUSH) 0.9 %
10 SYRINGE (ML) INJECTION AS NEEDED
Status: DISCONTINUED | OUTPATIENT
Start: 2024-02-29 | End: 2024-02-29 | Stop reason: HOSPADM

## 2024-02-29 RX ORDER — ONDANSETRON 2 MG/ML
8 INJECTION INTRAMUSCULAR; INTRAVENOUS ONCE
Status: COMPLETED | OUTPATIENT
Start: 2024-02-29 | End: 2024-02-29

## 2024-02-29 RX ADMIN — SODIUM CHLORIDE 1000 ML: 9 INJECTION, SOLUTION INTRAVENOUS at 02:56

## 2024-02-29 RX ADMIN — DIPHENOXYLATE HYDROCHLORIDE AND ATROPINE SULFATE 2 TABLET: 2.5; .025 TABLET ORAL at 02:53

## 2024-02-29 RX ADMIN — ONDANSETRON 8 MG: 2 INJECTION INTRAMUSCULAR; INTRAVENOUS at 02:53

## 2024-02-29 NOTE — ED PROVIDER NOTES
"Subjective     History provided by:  Patient    History of Present Illness    Chief complaint: Diarrhea    Location: GI tract    Quality/Severity: The patient has had numerous episodes of watery diarrhea.    Timing/Onset: Started 2 days ago.    Modifying Factors: Any p.o. exacerbates the diarrhea.    Associated symptoms: He has had nausea without vomiting.  No abdominal pain.  Subjective fever yesterday.  He has been lightheaded lately.    Narrative: The patient is a 73-year-old white male complaining of diarrhea for 2 days.  He has had associated subjective fever and nausea.  Denies abdominal pain.  Denies being on antibiotics recently.  Denies well water.  Denies being exposed to anyone else with diarrhea.  Denies a history of similar diarrheal illness.  Denies any blood in his diarrhea.    Review of Systems  Past Medical History:   Diagnosis Date    Cancer     PROSTATE    Hard of hearing     BILATERAL    History of hepatitis B     Hyperlipidemia     Hypertension     Kidney stone      /94 (BP Location: Right arm, Patient Position: Lying)   Pulse 87   Temp 99.1 °F (37.3 °C) (Oral)   Resp 20   Ht 170.2 cm (67\")   Wt 90.7 kg (200 lb)   SpO2 93%   BMI 31.32 kg/m²     Past Medical History:   Diagnosis Date    Cancer     PROSTATE    Hard of hearing     BILATERAL    History of hepatitis B     Hyperlipidemia     Hypertension     Kidney stone      The encounter diagnosis was Viral gastroenteritis.  No orders to display     Final diagnoses:   Viral gastroenteritis       No Known Allergies    Past Surgical History:   Procedure Laterality Date    COLONOSCOPY      EXTRACORPOREAL SHOCK WAVE LITHOTRIPSY (ESWL) Right 11/2/2018    Procedure: RIGHT EXTRACORPOREAL SHOCKWAVE LITHOTRIPSY;  Surgeon: Kevin Manzano MD;  Location: [facility] OR [facility];  Service: Urology    EXTRACORPOREAL SHOCK WAVE LITHOTRIPSY (ESWL) Right 12/21/2018    Procedure: RIGHT EXTRACORPOREAL SHOCKWAVE LITHOTRIPSY;  Surgeon: Yehuda Lindsay MD;  " [place] [facility] [place] [facility] [place]  Service: Urology    EYE SURGERY      HEMORRHOIDECTOMY         Family History   Problem Relation Age of Onset    Malig Hyperthermia Neg Hx        Social History     Socioeconomic History    Marital status:    Tobacco Use    Smoking status: Former     Packs/day: .5     Types: Cigarettes     Quit date:      Years since quittin.1    Smokeless tobacco: Never   Substance and Sexual Activity    Alcohol use: Yes     Alcohol/week: 3.0 standard drinks of alcohol     Types: 3 Cans of beer per week     Comment: DAILY    Drug use: No    Sexual activity: Defer           Objective   Physical Exam  Vitals and nursing note reviewed.   Constitutional:       General: He is not in acute distress.     Appearance: Normal appearance. He is well-developed. He is not ill-appearing, toxic-appearing or diaphoretic.      Comments: The patient appears healthy in no acute distress.  Review of his vital signs: He is afebrile with a temperature of 99.1, blood pressure slightly elevated 133/94, remainder vital signs within normal limits.   HENT:      Head: Normocephalic and atraumatic.      Nose: Nose normal.      Mouth/Throat:      Mouth: Mucous membranes are moist.      Pharynx: Oropharynx is clear.   Eyes:      General: No scleral icterus.        Right eye: No discharge.         Left eye: No discharge.      Conjunctiva/sclera: Conjunctivae normal.      Pupils: Pupils are equal, round, and reactive to light.   Neck:      Thyroid: No thyromegaly.      Vascular: No JVD.   Cardiovascular:      Rate and Rhythm: Normal rate and regular rhythm.      Heart sounds: Normal heart sounds. No murmur heard.  Pulmonary:      Effort: Pulmonary effort is normal.      Breath sounds: Normal breath sounds. No wheezing, rhonchi or rales.   Chest:      Chest wall: No tenderness.   Abdominal:      General: Bowel sounds are normal. There is no distension.      Palpations: Abdomen is soft.      Tenderness: There is no abdominal  tenderness. There is no right CVA tenderness, guarding or rebound.   Musculoskeletal:         General: No tenderness or deformity. Normal range of motion.      Cervical back: Normal range of motion and neck supple.   Lymphadenopathy:      Cervical: No cervical adenopathy.   Skin:     General: Skin is warm and dry.      Capillary Refill: Capillary refill takes less than 2 seconds.      Coloration: Skin is not pale.      Findings: No erythema or rash.   Neurological:      General: No focal deficit present.      Mental Status: He is alert and oriented to person, place, and time.      Cranial Nerves: No cranial nerve deficit.      Coordination: Coordination normal.      Comments: No focal motor sensory deficit   Psychiatric:         Mood and Affect: Mood normal.         Behavior: Behavior normal.         Thought Content: Thought content normal.         Judgment: Judgment normal.         Procedures           ED Course  ED Course as of 02/29/24 0509   Thu Feb 29, 2024   0439 Review the patient's laboratory studies: The patient's CMP has an elevated glucose of 141, otherwise is essentially normal.  The BUN is normal at 15 consistent with euvolemia.  CBC has a normal white count of 6.4 with a left shift.  Hemoglobin, hematocrit within normal limits.  Platelets slightly low 134.  Urinalysis is negative for infection. [TP]   0440 The patient was administered a liter normal saline IV bolus.  He was administered Zofran 8 mg IV for nausea.  For diarrhea he was administered Lomotil 2 tablets p.o. [TP]      ED Course User Index  [TP] Gilles Bhakta MD                                             Medical Decision Making  Problems Addressed:  Viral gastroenteritis: complicated acute illness or injury    Amount and/or Complexity of Data Reviewed  Labs: ordered. Decision-making details documented in ED Course.    Risk  Prescription drug management.        Final diagnoses:   Viral gastroenteritis       ED Disposition  ED Disposition        ED Disposition   Discharge    Condition   Stable    Comment   --               iMng Thompson MD  [address] [facility] [place] [id number] [phone]    Schedule an appointment as soon as possible for a visit in 3 days  If not improved         Medication List        New Prescriptions      diphenoxylate-atropine 2.5-0.025 MG per tablet  Commonly known as: LOMOTIL  1 to 2 tablets p.o. every 6 hours as needed diarrhea.     ondansetron ODT 8 MG disintegrating tablet  Commonly known as: ZOFRAN-ODT  One tablet po q 6 hours PRN nausea and vomiting               Where to Get Your Medications        These medications were sent to [facility]/pharmacy #[id number] - [place], [place] - [address] - [phone]  - [phone]   [address] [address]      Phone: [phone]   diphenoxylate-atropine 2.5-0.025 MG per tablet  ondansetron ODT 8 MG disintegrating tablet           Labs Reviewed   COMPREHENSIVE METABOLIC PANEL - Abnormal; Notable for the following components:       Result Value    Glucose 141 (*)     Sodium 133 (*)     Calcium 8.2 (*)     All other components within normal limits    Narrative:     GFR Normal >60  Chronic Kidney Disease <60  Kidney Failure <15    The GFR formula is only valid for adults with stable renal function between ages 18 and 70.   URINALYSIS W/ MICROSCOPIC IF INDICATED (NO CULTURE) - Abnormal; Notable for the following components:    Ketones, UA Trace (*)     Bilirubin, UA Small (1+) (*)     Blood, UA Trace (*)     Protein,  mg/dL (2+) (*)     All other components within normal limits   CBC WITH AUTO DIFFERENTIAL - Abnormal; Notable for the following components:    Platelets 134 (*)     Neutrophil % 82.7 (*)     Lymphocyte % 10.3 (*)     Eosinophil % 0.0 (*)     Lymphocytes, Absolute 0.66 (*)     All other components within normal limits   URINALYSIS, MICROSCOPIC ONLY - Abnormal; Notable for the following components:    RBC, UA 3-5 (*)     Bacteria, UA Trace (*)      Squamous Epithelial Cells, UA 3-6 (*)     All other components within normal limits   LIPASE - Normal   SCAN SLIDE   CBC AND DIFFERENTIAL    Narrative:     The following orders were created for panel order CBC & Differential.  Procedure                               Abnormality         Status                     ---------                               -----------         ------                     CBC Auto Differential[[id number]]        Abnormal            Final result               Scan Slide[[id number]]                                       Final result                 Please view results for these tests on the individual orders.     No orders to display          Medication List        New Prescriptions      diphenoxylate-atropine 2.5-0.025 MG per tablet  Commonly known as: LOMOTIL  1 to 2 tablets p.o. every 6 hours as needed diarrhea.     ondansetron ODT 8 MG disintegrating tablet  Commonly known as: ZOFRAN-ODT  One tablet po q 6 hours PRN nausea and vomiting               Where to Get Your Medications        These medications were sent to [facility]/pharmacy #[id number] - [place], [place] - [address] [facility] [facility] - [phone]  - [phone] [address] [address]      Phone: [phone]   diphenoxylate-atropine 2.5-0.025 MG per tablet  ondansetron ODT 8 MG disintegrating tablet              Gilles Bhakta MD  02/29/24 7927

## 2024-04-03 ENCOUNTER — TRANSCRIBE ORDERS (OUTPATIENT)
Dept: ADMINISTRATIVE | Facility: HOSPITAL | Age: 74
End: 2024-04-03
Payer: MEDICARE

## 2024-04-03 DIAGNOSIS — N43.3 HYDROCELE, UNSPECIFIED HYDROCELE TYPE: Primary | ICD-10-CM

## 2024-04-11 ENCOUNTER — HOSPITAL ENCOUNTER (OUTPATIENT)
Dept: CT IMAGING | Facility: HOSPITAL | Age: 74
Discharge: HOME | End: 2024-04-11
Payer: MEDICARE

## 2024-04-11 DIAGNOSIS — N43.3 HYDROCELE, UNSPECIFIED HYDROCELE TYPE: ICD-10-CM

## 2024-04-11 PROCEDURE — 25510000001 IOPAMIDOL PER 1 ML: Performed by: UROLOGY

## 2024-04-11 PROCEDURE — 74178 CT ABD&PLV WO CNTR FLWD CNTR: CPT

## 2024-04-11 RX ADMIN — IOPAMIDOL 100 ML: 755 INJECTION, SOLUTION INTRAVENOUS at 09:17

## 2024-06-03 ENCOUNTER — PRE-ADMISSION TESTING (OUTPATIENT)
Dept: PREADMISSION TESTING | Facility: HOSPITAL | Age: 74
End: 2024-06-03
Payer: MEDICARE

## 2024-06-03 VITALS
OXYGEN SATURATION: 97 % | RESPIRATION RATE: 16 BRPM | DIASTOLIC BLOOD PRESSURE: 83 MMHG | WEIGHT: 220.1 LBS | SYSTOLIC BLOOD PRESSURE: 137 MMHG | HEIGHT: 67 IN | HEART RATE: 69 BPM | BODY MASS INDEX: 34.55 KG/M2

## 2024-06-03 LAB
ANION GAP SERPL CALCULATED.3IONS-SCNC: 9.9 MMOL/L (ref 5–15)
BUN SERPL-MCNC: 14 MG/DL (ref 8–23)
BUN/CREAT SERPL: 15.9 (ref 7–25)
CALCIUM SPEC-SCNC: 9.1 MG/DL (ref 8.6–10.5)
CHLORIDE SERPL-SCNC: 104 MMOL/L (ref 98–107)
CO2 SERPL-SCNC: 25.1 MMOL/L (ref 22–29)
CREAT SERPL-MCNC: 0.88 MG/DL (ref 0.76–1.27)
DEPRECATED RDW RBC AUTO: 47.3 FL (ref 37–54)
EGFRCR SERPLBLD CKD-EPI 2021: 89.7 ML/MIN/1.73
ERYTHROCYTE [DISTWIDTH] IN BLOOD BY AUTOMATED COUNT: 13.2 % (ref 12.3–15.4)
GLUCOSE SERPL-MCNC: 110 MG/DL (ref 65–99)
HBA1C MFR BLD: 5.86 % (ref 4.8–5.6)
HCT VFR BLD AUTO: 42.4 % (ref 37.5–51)
HGB BLD-MCNC: 14.3 G/DL (ref 13–17.7)
MCH RBC QN AUTO: 32.1 PG (ref 26.6–33)
MCHC RBC AUTO-ENTMCNC: 33.7 G/DL (ref 31.5–35.7)
MCV RBC AUTO: 95.1 FL (ref 79–97)
PLATELET # BLD AUTO: 187 10*3/MM3 (ref 140–450)
PMV BLD AUTO: 10 FL (ref 6–12)
POTASSIUM SERPL-SCNC: 4.6 MMOL/L (ref 3.5–5.2)
RBC # BLD AUTO: 4.46 10*6/MM3 (ref 4.14–5.8)
SODIUM SERPL-SCNC: 139 MMOL/L (ref 136–145)
WBC NRBC COR # BLD AUTO: 5.24 10*3/MM3 (ref 3.4–10.8)

## 2024-06-03 PROCEDURE — 80048 BASIC METABOLIC PNL TOTAL CA: CPT | Performed by: UROLOGY

## 2024-06-03 PROCEDURE — 83036 HEMOGLOBIN GLYCOSYLATED A1C: CPT | Performed by: UROLOGY

## 2024-06-03 PROCEDURE — 36415 COLL VENOUS BLD VENIPUNCTURE: CPT

## 2024-06-03 PROCEDURE — 93005 ELECTROCARDIOGRAM TRACING: CPT

## 2024-06-03 PROCEDURE — 85027 COMPLETE CBC AUTOMATED: CPT | Performed by: UROLOGY

## 2024-06-03 RX ORDER — ASPIRIN 81 MG/1
81 TABLET ORAL DAILY
COMMUNITY

## 2024-06-03 NOTE — PAT
Pt here for PAT visit.  Pre-op tests completed, chg soap given, and instructions reviewed.  Instructed clears until 2 hrs prior to arrival time, voiced understanding. A-Fib rate controlled seen on patient EKG, patient denies history of any irregular heart rhythms. Patient denies any symptoms or issues on-going. RN messaged patients surgeon's office to report EKG findings. Patient is aware and in agreement with his treatment plan.

## 2024-06-03 NOTE — DISCHARGE INSTRUCTIONS
PRE-ADMISSION TESTING INSTRUCTIONS FOR ADULTS    Take these medications the morning of surgery with a small sip of water: atorvastatin      Do not take any insulin or diabetes medications the morning of surgery.      No aspirin, advil, aleve, ibuprofen, naproxen, diet pills, decongestants, or herbal/vitamins for a week prior to surgery.       Tylenol/Acetaminophen is okay to take if needed.    General Instructions:    DO NOT EAT SOLID FOOD AFTER MIDNIGHT THE NIGHT BEFORE SURGERY. No gum, mints, or hard candy after midnight the night before surgery.  You may drink clear liquids the day of surgery up until 2 hours before your arrival time.  Clear liquids are liquids you can see through. Nothing RED in color.    Plain water    Sports drinks      Gelatin (Jell-O)  Fruit juices without pulp such as white grape juice and apple juice  Popsicles that contain no fruit or yogurt  Tea or coffee (no cream or milk added)    It is beneficial for you to have a clear drink that contains carbohydrates 2 hours before your arrival time.  We suggest a 20 ounce bottle of Gatorade or Powerade for non-diabetic patients or a 20 ounce bottle of Gatorade Zero or Powerade Zero for diabetic patients.     Patients who avoid smoking, chewing tobacco and alcohol for 4 weeks prior to surgery have a reduced risk of post-operative complications.  If at all possible, quit smoking as many days before surgery as you can.    Do not smoke, use chewing tobacco or drink alcohol the day of surgery    Bring your C-PAP/ BI-PAP machine if you use one.  Wear clean comfortable clothes.  Do not wear contact lenses, lotion, deodorant, or make-up.  Bring a case for your glasses if applicable. You may brush your teeth the morning of surgery.  You may wear dentures/partials, do not put adhesive/glue on them.  Leave all other jewelry and valuables at home.      Preventing a Surgical Site Infection:    Shower the night before and on the morning of surgery using the  chlorhexidine soap you were given.  Use a clean washcloth with the soap.  Place clean sheets on your bed after showering the night before surgery. Do not use the CHG soap on your hair, face, or private areas. Wash your body gently for five (5) minutes. Do not scrub your skin.  Dry with a clean towel and dress in clean clothing.  Do not shave the surgical area for 10 days-2 weeks prior to surgery  because the razor can irritate skin and make it easier to develop an infection.  Make sure you, your family, and all healthcare providers clean their hands with soap and water or an alcohol based hand  before caring for you or your wound.      Day of surgery:    Your surgeon’s office will advise you of your arrival time for the day of surgery.    Upon arrival, a Pre-op nurse and Anesthesia provider will review your health history, obtain vital signs, and answer questions you may have. The anesthesia provider will also discuss the type of anesthesia that will be needed for your procedure, which may include general anesthesia. The only belongings needed at this time will be your home medications and if applicable your C-PAP/BI-PAP machine.  If you are staying overnight your family can leave the rest of your belongings in the car and bring them to your room later.  A Pre-op nurse will start an IV and you may receive medication in preparation for surgery, including something to help you relax.  Your family will be able to see you in the Pre-op area.  While you are in surgery your family should notify the waiting room  if they leave the waiting room area and provide a contact phone number.    IF you have any questions, you can call the Pre-Admission Department at [phone] or your surgeon's office.  Notify your surgeon if  you become sick, have a fever, productive cough, or cannot be here the day of surgery    Please be aware that surgery does come with discomfort.  We want to make every effort to  control your discomfort so please discuss any uncontrolled symptoms with your nurse.   Your doctor will most likely have prescribed pain medications.      If you are going home after surgery, you will receive individualized written care instructions before being discharged.  A responsible adult (over the age of 18) must drive you to and from the hospital on the day of your surgery and stay with you for 24 hours after anesthesia.    If you are staying overnight following surgery, you will be transported to your hospital room following the recovery period.  The Medical Center has all private rooms.    You may receive a survey regarding the care you received. Your feedback is very important and will be used to collect the necessary data to help us to continue to provide excellent care.     Deductibles and co-payments are collected on the day of service. Please be prepared to pay the required co-pay, deductible or deposit on the day of service as defined by your plan.

## 2024-06-04 LAB
QT INTERVAL: 405 MS
QTC INTERVAL: 436 MS

## 2024-06-06 RX ORDER — SODIUM CHLORIDE, SODIUM LACTATE, POTASSIUM CHLORIDE, CALCIUM CHLORIDE 600; 310; 30; 20 MG/100ML; MG/100ML; MG/100ML; MG/100ML
9 INJECTION, SOLUTION INTRAVENOUS CONTINUOUS PRN
Status: CANCELLED | OUTPATIENT
Start: 2024-06-06

## 2024-06-06 RX ORDER — SODIUM CHLORIDE 0.9 % (FLUSH) 0.9 %
10 SYRINGE (ML) INJECTION AS NEEDED
Status: CANCELLED | OUTPATIENT
Start: 2024-06-06

## 2024-06-06 RX ORDER — MIDAZOLAM HYDROCHLORIDE 2 MG/2ML
0.5 INJECTION, SOLUTION INTRAMUSCULAR; INTRAVENOUS
Status: CANCELLED | OUTPATIENT
Start: 2024-06-06

## 2024-06-19 ENCOUNTER — OFFICE VISIT (OUTPATIENT)
Dept: CARDIOLOGY | Facility: CLINIC | Age: 74
End: 2024-06-19
Payer: MEDICARE

## 2024-06-19 VITALS
BODY MASS INDEX: 34.15 KG/M2 | HEIGHT: 67 IN | HEART RATE: 74 BPM | WEIGHT: 217.6 LBS | RESPIRATION RATE: 20 BRPM | SYSTOLIC BLOOD PRESSURE: 130 MMHG | OXYGEN SATURATION: 96 % | DIASTOLIC BLOOD PRESSURE: 76 MMHG

## 2024-06-19 DIAGNOSIS — I48.19 PERSISTENT ATRIAL FIBRILLATION: Primary | ICD-10-CM

## 2024-06-19 DIAGNOSIS — E78.2 MIXED HYPERLIPIDEMIA: ICD-10-CM

## 2024-06-19 DIAGNOSIS — I10 PRIMARY HYPERTENSION: ICD-10-CM

## 2024-06-19 RX ORDER — METOPROLOL SUCCINATE 25 MG/1
25 TABLET, EXTENDED RELEASE ORAL DAILY
COMMUNITY
Start: 2024-06-10 | End: 2024-12-07

## 2024-06-19 NOTE — PROGRESS NOTES
[facility]    Subjective:     Encounter Date:06/19/24      Patient ID: Christiano Sandoval is a 73 y.o. male.    Chief Complaint:   Chief Complaint   Patient presents with    New Patient     Pre-op Clearance        History of Present Illness    Mr. Sandoval is a pleasant 73-year-old gentleman past medical history hyperlipidemia, left-sided hydrocele, who presents for further evaluation after abnormal preoperative cardiac testing revealed atrial fibrillation.  Patient has no symptoms, he is not aware of any irregular heartbeat, and he states that he is otherwise felt pretty well.     He was noted to have A-fib on his testing, and he saw his PCP who initiated him on Xarelto therapy, as well as metoprolol succinate.  He presents today to establish care.      He presents today to establish care.  His PCP did start him on Xarelto and denies any side effects.  He started metoprolol succinate but does not feel any different.  Otherwise he feels well.  No complaints today.  He has never had any prior strokes.    The following portions of the patient's history were reviewed and updated as appropriate: allergies, current medications, past family history, past medical history, past social history, past surgical history and problem list.    Past Medical History:   Diagnosis Date    Cancer     PROSTATE    Hard of hearing     BILATERAL    History of hepatitis B     Hyperlipidemia     Hypertension     Kidney stone        Past Surgical History:   Procedure Laterality Date    COLONOSCOPY      EXTRACORPOREAL SHOCK WAVE LITHOTRIPSY (ESWL) Right 11/02/2018    Procedure: RIGHT EXTRACORPOREAL SHOCKWAVE LITHOTRIPSY;  Surgeon: Kevin Manzano MD;  Location: [facility] OR [facility];  Service: Urology    EXTRACORPOREAL SHOCK WAVE LITHOTRIPSY (ESWL) Right 12/21/2018    Procedure: RIGHT EXTRACORPOREAL SHOCKWAVE LITHOTRIPSY;  Surgeon: Yehuda Lindsay MD;  Location: [facility] OR [facility];  Service: Urology    EYE SURGERY Bilateral      "tear ducts    HEMORRHOIDECTOMY             ECG 12 Lead    Date/Time: 6/19/2024 10:30 AM  Performed by: Ignacio Durán MD    Authorized by: Ignacio Durán MD  Comparison: compared with previous ECG from 6/3/2024  Similar to previous ECG  Rhythm: atrial fibrillation  Rate: normal  Conduction: conduction normal  ST Segments: ST segments normal  T Waves: T waves normal  QRS axis: normal  Other findings: non-specific ST-T wave changes    Clinical impression: abnormal EKG             Objective:     Vitals:    06/19/24 1016   BP: 130/76   BP Location: Left arm   Patient Position: Sitting   Cuff Size: Adult   Pulse: 74   Resp: 20   SpO2: 96%   Weight: 98.7 kg (217 lb 9.6 oz)   Height: 170.2 cm (67\")         Constitutional:       Appearance: Healthy appearance. Not in distress.   Neck:      Vascular: JVD normal.   Pulmonary:      Effort: Pulmonary effort is normal.      Breath sounds: Normal breath sounds.   Cardiovascular:      PMI at left midclavicular line. Normal rate. Irregularly irregular rhythm. Normal S2.       Murmurs: There is no murmur.   Pulses:     Intact distal pulses.   Edema:     Peripheral edema absent.   Skin:     General: Skin is warm and dry.   Neurological:      General: No focal deficit present.      Mental Status: Alert, oriented to person, place, and time and oriented to person, place and time.   Psychiatric:         Mood and Affect: Mood and affect normal.         Lab Review:       BUN   Date Value Ref Range Status   06/03/2024 14 8 - 23 mg/dL Final     Creatinine   Date Value Ref Range Status   06/03/2024 0.88 0.76 - 1.27 mg/dL Final   10/08/2020 1.10 0.60 - 1.30 mg/dL Final     Comment:     Serial Number: [id number]:  [id number]   10/30/2018 0.9 0.6 - 1.3 mg/dL Final     Potassium   Date Value Ref Range Status   06/03/2024 4.6 3.5 - 5.2 mmol/L Final     ALT (SGPT)   Date Value Ref Range Status   02/29/2024 15 1 - 41 U/L Final     Comment:     Appended report. These results have been appended to a " previously preliminary verified report.     AST (SGOT)   Date Value Ref Range Status   02/29/2024 16 1 - 40 U/L Final     Comment:     Appended report. These results have been appended to a previously preliminary verified report.         Performed        Assessment:          Diagnosis Plan   1. Persistent atrial fibrillation  ECG 12 Lead    Adult Transthoracic Echo Complete w/ Color, Spectral and Contrast if necessary per protocol      2. Primary hypertension  ECG 12 Lead    Adult Transthoracic Echo Complete w/ Color, Spectral and Contrast if necessary per protocol      3. Mixed hyperlipidemia               Plan:         Atrial fibrillation: Asymptomatic.  Rate is currently controlled.  PCP started on Xarelto, continue  [id number]  Will obtain echocardiogram  He is on metoprolol succinate 25 daily, he reports he feels about the same.  His resting heart rate is in the 70s I would favor continuing this regimen  Preoperative cardiovascular risk assessment for upcoming noncardiac surgery: He is being scheduled for a urologic procedure with Dr. Yehuda Lindsay for left hydrocelectomy  From the cardiovascular risk standpoint, he is low risk for preoperative M ACE.  He is functionally active, has no exertional complaints, states he feels well overall.  We are going to obtain the echocardiogram but I do not see this having much bearing on his surgical risk as he otherwise feels pretty well does not be guideline recommendations for any additional cardiac testing prior to his surgery given his functional capacity and able to exceed 4 METS of exertion without limitations  Regarding his Xarelto anticoagulation, reasonable to hold 2 to 3 days prior to his surgery and then resume as soon as safe afterward.  Further with holding of anticoagulation can be considered per the surgeon's discretion   Hyperlipidemia: On statin therapy, per PCP.    Thank you for allowing me to participate in the care of Christiano Boucherred. Feel free to  contact me directly with any further questions or concerns.    RTC 6 months    Ignacio Durán MD  [facility]  06/19/24  10:14 EDT       Current Outpatient Medications:     atorvastatin (LIPITOR) 10 MG tablet, Take 1 tablet by mouth Daily., Disp: , Rfl:     metoprolol succinate XL (TOPROL-XL) 25 MG 24 hr tablet, Take 1 tablet by mouth Daily., Disp: , Rfl:     Multiple Vitamins-Minerals (MULTIVITAL PO), Take 1 tablet by mouth Daily. HOLD FOR SURGERY, Disp: , Rfl:     rivaroxaban (XARELTO) 20 MG tablet, Take 1 tablet by mouth Daily. Indications: Atrial Fibrillation, Disp: , Rfl:          Return in about 6 months (around 12/19/2024).      Part of this note may be an electronic transcription/translation of spoken language to printed text using the Dragon Dictation System.

## 2024-06-19 NOTE — LETTER
June 19, 2024     Yehuda Lindsay MD  [address]  [address]  [place] [address]    Patient: Christiano Sandoval   YOB: 1950   Date of Visit: 6/19/2024     Dear Yehuda Lindsay MD:       Thank you for referring Christiano Sandoval to me for evaluation. Below are the relevant portions of my assessment and plan of care.    If you have questions, please do not hesitate to call me. I look forward to following Christiano along with you.         Sincerely,        Ignacio Durán MD        CC: No Recipients    Ignacio Durán MD  06/19/24 1043  Sign when Signing Visit        [facility]    Subjective:     Encounter Date:06/19/24      Patient ID: Christiano Sandoval is a 73 y.o. male.    Chief Complaint:   Chief Complaint   Patient presents with   • New Patient     Pre-op Clearance        History of Present Illness    Mr. Sandoval is a pleasant 73-year-old gentleman past medical history hyperlipidemia, left-sided hydrocele, who presents for further evaluation after abnormal preoperative cardiac testing revealed atrial fibrillation.  Patient has no symptoms, he is not aware of any irregular heartbeat, and he states that he is otherwise felt pretty well.     He was noted to have A-fib on his testing, and he saw his PCP who initiated him on Xarelto therapy, as well as metoprolol succinate.  He presents today to establish care.      He presents today to establish care.  His PCP did start him on Xarelto and denies any side effects.  He started metoprolol succinate but does not feel any different.  Otherwise he feels well.  No complaints today.  He has never had any prior strokes.    The following portions of the patient's history were reviewed and updated as appropriate: allergies, current medications, past family history, past medical history, past social history, past surgical history and problem list.    Past Medical History:   Diagnosis Date   • Cancer     PROSTATE   • Hard of hearing     BILATERAL   •  "History of hepatitis B    • Hyperlipidemia    • Hypertension    • Kidney stone        Past Surgical History:   Procedure Laterality Date   • COLONOSCOPY     • EXTRACORPOREAL SHOCK WAVE LITHOTRIPSY (ESWL) Right 11/02/2018    Procedure: RIGHT EXTRACORPOREAL SHOCKWAVE LITHOTRIPSY;  Surgeon: Kevin Manzano MD;  Location: [facility];  Service: Urology   • EXTRACORPOREAL SHOCK WAVE LITHOTRIPSY (ESWL) Right 12/21/2018    Procedure: RIGHT EXTRACORPOREAL SHOCKWAVE LITHOTRIPSY;  Surgeon: Yehuda Lindsay MD;  Location: [facility];  Service: Urology   • EYE SURGERY Bilateral     tear ducts   • HEMORRHOIDECTOMY             ECG 12 Lead    Date/Time: 6/19/2024 10:30 AM  Performed by: Ignacio Durán MD    Authorized by: Ignacio Durán MD  Comparison: compared with previous ECG from 6/3/2024  Similar to previous ECG  Rhythm: atrial fibrillation  Rate: normal  Conduction: conduction normal  ST Segments: ST segments normal  T Waves: T waves normal  QRS axis: normal  Other findings: non-specific ST-T wave changes    Clinical impression: abnormal EKG             Objective:     Vitals:    06/19/24 1016   BP: 130/76   BP Location: Left arm   Patient Position: Sitting   Cuff Size: Adult   Pulse: 74   Resp: 20   SpO2: 96%   Weight: 98.7 kg (217 lb 9.6 oz)   Height: 170.2 cm (67\")         Constitutional:       Appearance: Healthy appearance. Not in distress.   Neck:      Vascular: JVD normal.   Pulmonary:      Effort: Pulmonary effort is normal.      Breath sounds: Normal breath sounds.   Cardiovascular:      PMI at left midclavicular line. Normal rate. Irregularly irregular rhythm. Normal S2.       Murmurs: There is no murmur.   Pulses:     Intact distal pulses.   Edema:     Peripheral edema absent.   Skin:     General: Skin is warm and dry.   Neurological:      General: No focal deficit present.      Mental Status: Alert, oriented to person, place, and time and oriented to person, place and time.   Psychiatric:         Mood " and Affect: Mood and affect normal.         Lab Review:       BUN   Date Value Ref Range Status   06/03/2024 14 8 - 23 mg/dL Final     Creatinine   Date Value Ref Range Status   06/03/2024 0.88 0.76 - 1.27 mg/dL Final   10/08/2020 1.10 0.60 - 1.30 mg/dL Final     Comment:     Serial Number: [id number]:  [id number]   10/30/2018 0.9 0.6 - 1.3 mg/dL Final     Potassium   Date Value Ref Range Status   06/03/2024 4.6 3.5 - 5.2 mmol/L Final     ALT (SGPT)   Date Value Ref Range Status   02/29/2024 15 1 - 41 U/L Final     Comment:     Appended report. These results have been appended to a previously preliminary verified report.     AST (SGOT)   Date Value Ref Range Status   02/29/2024 16 1 - 40 U/L Final     Comment:     Appended report. These results have been appended to a previously preliminary verified report.         Performed        Assessment:          Diagnosis Plan   1. Persistent atrial fibrillation  ECG 12 Lead    Adult Transthoracic Echo Complete w/ Color, Spectral and Contrast if necessary per protocol      2. Primary hypertension  ECG 12 Lead    Adult Transthoracic Echo Complete w/ Color, Spectral and Contrast if necessary per protocol      3. Mixed hyperlipidemia               Plan:         Atrial fibrillation: Asymptomatic.  Rate is currently controlled.  PCP started on Xarelto, continue  [id number] 2  Will obtain echocardiogram  He is on metoprolol succinate 25 daily, he reports he feels about the same.  His resting heart rate is in the 70s I would favor continuing this regimen  Preoperative cardiovascular risk assessment for upcoming noncardiac surgery: He is being scheduled for a urologic procedure with Dr. Yehuda Lindsay for left hydrocelectomy  From the cardiovascular risk standpoint, he is low risk for preoperative M ACE.  He is functionally active, has no exertional complaints, states he feels well overall.  We are going to obtain the echocardiogram but I do not see this having much bearing on his  surgical risk as he otherwise feels pretty well does not be guideline recommendations for any additional cardiac testing prior to his surgery given his functional capacity and able to exceed 4 METS of exertion without limitations  Regarding his Xarelto anticoagulation, reasonable to hold 2 to 3 days prior to his surgery and then resume as soon as safe afterward.  Further with holding of anticoagulation can be considered per the surgeon's discretion   Hyperlipidemia: On statin therapy, per PCP.    Thank you for allowing me to participate in the care of Christiano Sandoval. Feel free to contact me directly with any further questions or concerns.    RTC 6 months    Ignacio Durán MD  [facility]  06/19/24  10:14 EDT       Current Outpatient Medications:   •  atorvastatin (LIPITOR) 10 MG tablet, Take 1 tablet by mouth Daily., Disp: , Rfl:   •  metoprolol succinate XL (TOPROL-XL) 25 MG 24 hr tablet, Take 1 tablet by mouth Daily., Disp: , Rfl:   •  Multiple Vitamins-Minerals (MULTIVITAL PO), Take 1 tablet by mouth Daily. HOLD FOR SURGERY, Disp: , Rfl:   •  rivaroxaban (XARELTO) 20 MG tablet, Take 1 tablet by mouth Daily. Indications: Atrial Fibrillation, Disp: , Rfl:          Return in about 6 months (around 12/19/2024).      Part of this note may be an electronic transcription/translation of spoken language to printed text using the Dragon Dictation System.

## 2024-06-26 ENCOUNTER — HOSPITAL ENCOUNTER (OUTPATIENT)
Dept: CARDIOLOGY | Facility: HOSPITAL | Age: 74
Discharge: HOME | End: 2024-06-26
Admitting: STUDENT IN AN ORGANIZED HEALTH CARE EDUCATION/TRAINING PROGRAM
Payer: MEDICARE

## 2024-06-26 VITALS
BODY MASS INDEX: 34.26 KG/M2 | SYSTOLIC BLOOD PRESSURE: 124 MMHG | HEART RATE: 69 BPM | DIASTOLIC BLOOD PRESSURE: 69 MMHG | WEIGHT: 218.26 LBS | HEIGHT: 67 IN

## 2024-06-26 DIAGNOSIS — I10 PRIMARY HYPERTENSION: ICD-10-CM

## 2024-06-26 DIAGNOSIS — I48.19 PERSISTENT ATRIAL FIBRILLATION: ICD-10-CM

## 2024-06-26 LAB
AORTIC DIMENSIONLESS INDEX: 0.7 (DI)
ASCENDING AORTA: 3.8 CM
BH CV ECHO MEAS - ACS: 1.93 CM
BH CV ECHO MEAS - AO MAX PG: 8.5 MMHG
BH CV ECHO MEAS - AO MEAN PG: 5 MMHG
BH CV ECHO MEAS - AO V2 MAX: 146 CM/SEC
BH CV ECHO MEAS - AO V2 VTI: 25.6 CM
BH CV ECHO MEAS - AVA(I,D): 2.5 CM2
BH CV ECHO MEAS - EDV(CUBED): 110.6 ML
BH CV ECHO MEAS - EDV(MOD-SP2): 90 ML
BH CV ECHO MEAS - EDV(MOD-SP4): 96 ML
BH CV ECHO MEAS - EF(MOD-BP): 60.7 %
BH CV ECHO MEAS - EF(MOD-SP2): 60 %
BH CV ECHO MEAS - EF(MOD-SP4): 60.4 %
BH CV ECHO MEAS - ESV(CUBED): 38.8 ML
BH CV ECHO MEAS - ESV(MOD-SP2): 36 ML
BH CV ECHO MEAS - ESV(MOD-SP4): 38 ML
BH CV ECHO MEAS - FS: 29.5 %
BH CV ECHO MEAS - IVS/LVPW: 1 CM
BH CV ECHO MEAS - IVSD: 1.1 CM
BH CV ECHO MEAS - LV DIASTOLIC VOL/BSA (35-75): 46 CM2
BH CV ECHO MEAS - LV MASS(C)D: 194 GRAMS
BH CV ECHO MEAS - LV MAX PG: 4.7 MMHG
BH CV ECHO MEAS - LV MEAN PG: 2 MMHG
BH CV ECHO MEAS - LV SYSTOLIC VOL/BSA (12-30): 18.2 CM2
BH CV ECHO MEAS - LV V1 MAX: 108 CM/SEC
BH CV ECHO MEAS - LV V1 VTI: 20.6 CM
BH CV ECHO MEAS - LVIDD: 4.8 CM
BH CV ECHO MEAS - LVIDS: 3.4 CM
BH CV ECHO MEAS - LVOT AREA: 3.2 CM2
BH CV ECHO MEAS - LVOT DIAM: 2 CM
BH CV ECHO MEAS - LVPWD: 1.1 CM
BH CV ECHO MEAS - MV DEC SLOPE: 781.1 CM/SEC2
BH CV ECHO MEAS - MV MAX PG: 4.3 MMHG
BH CV ECHO MEAS - MV MEAN PG: 1.02 MMHG
BH CV ECHO MEAS - MV P1/2T: 42.5 MSEC
BH CV ECHO MEAS - MV V2 VTI: 20.8 CM
BH CV ECHO MEAS - MVA(P1/2T): 5.2 CM2
BH CV ECHO MEAS - MVA(VTI): 3.1 CM2
BH CV ECHO MEAS - PA ACC TIME: 0.1 SEC
BH CV ECHO MEAS - PA V2 MAX: 115 CM/SEC
BH CV ECHO MEAS - QP/QS: 1.33
BH CV ECHO MEAS - RAP SYSTOLE: 3 MMHG
BH CV ECHO MEAS - RV MAX PG: 2.41 MMHG
BH CV ECHO MEAS - RV V1 MAX: 77.6 CM/SEC
BH CV ECHO MEAS - RV V1 VTI: 14.8 CM
BH CV ECHO MEAS - RVOT DIAM: 2.7 CM
BH CV ECHO MEAS - RVSP: 31 MMHG
BH CV ECHO MEAS - SV(LVOT): 65 ML
BH CV ECHO MEAS - SV(MOD-SP2): 54 ML
BH CV ECHO MEAS - SV(MOD-SP4): 58 ML
BH CV ECHO MEAS - SV(RVOT): 86.1 ML
BH CV ECHO MEAS - SVI(LVOT): 31.1 ML/M2
BH CV ECHO MEAS - SVI(MOD-SP2): 25.9 ML/M2
BH CV ECHO MEAS - SVI(MOD-SP4): 27.8 ML/M2
BH CV ECHO MEAS - TR MAX PG: 28 MMHG
BH CV ECHO MEAS - TR MAX VEL: 264.7 CM/SEC
BH CV XLRA - RV BASE: 4.3 CM
BH CV XLRA - RV LENGTH: 8.2 CM
BH CV XLRA - RV MID: 3.7 CM
BH CV XLRA - TDI S': 17.4 CM/SEC
LEFT ATRIUM VOLUME INDEX: 42.6 ML/M2
SINUS: 3.4 CM

## 2024-06-26 PROCEDURE — 93306 TTE W/DOPPLER COMPLETE: CPT | Performed by: INTERNAL MEDICINE

## 2024-06-26 PROCEDURE — 93306 TTE W/DOPPLER COMPLETE: CPT

## 2024-06-26 NOTE — PROGRESS NOTES
Please call let patient know that overall, his heart function is normal and there are no findings of heart failure.  There are a few things on this ultrasound to address.  The chambers that receive blood to the heart, called the atria, are very dilated, we can see this in patients of atrial fibrillation for some time, and so I think this points towards the A-fib being around for a while now.  It also shows the right side of the heart, which is only jobless warm to the lungs, is moderately dilated.  We commonly see this in patients who have sleep apnea as it can put strain on the right side of the heart as it strains the lungs at night.  If he has not been checked for sleep apnea, this should probably be considered as it can prevent this issue with the right side of his heart from becoming heart failure in the future.  Thank you very much for the help.

## 2024-06-27 ENCOUNTER — TELEPHONE (OUTPATIENT)
Dept: CARDIOLOGY | Facility: CLINIC | Age: 74
End: 2024-06-27
Payer: MEDICARE

## 2024-06-27 DIAGNOSIS — I51.7 RIGHT VENTRICULAR DILATION: Primary | ICD-10-CM

## 2024-06-27 DIAGNOSIS — G47.10 HYPERSOMNOLENCE: ICD-10-CM

## 2024-06-27 NOTE — TELEPHONE ENCOUNTER
Thank you.  I sent a referral to the sleep clinic in [place] for him to get set up there and they can discuss whether or not he needs a sleep test, or other testing to look for sleep disordered breathing that would cause some of these findings.

## 2024-06-27 NOTE — TELEPHONE ENCOUNTER
Attempted to contact pt, no answer, and VM is full, will continue to try to reach pt.    HUB- please put patient straight through to triage    Bia Cervantes RN  Triage RN  06/27/24 10:30 EDT

## 2024-06-27 NOTE — TELEPHONE ENCOUNTER
Results and recommendations called to pt.  Instructed to call with any further questions or concerns.  Verbalized understanding.    Dr. Durán- pt states that he has never been tested for JENNIFER.  He states that he doesn't sleep well in general, and is up and down most nights, but has been told that he doesn't snore.  He would be willing to do a sleep study if you think he needs one.    Bia Cervantes, RN  Triage Nurse, [facility]  06/27/24 09:58 EDT

## 2024-06-28 NOTE — TELEPHONE ENCOUNTER
Attempted to contact pt, no answer, and VM is full, will continue to try to reach pt.     HUB- please put patient straight through to triage       Bia Cervantes RN  Triage RN  06/28/24 10:19 EDT

## 2024-06-28 NOTE — TELEPHONE ENCOUNTER
Pt called back in to triage.  Reviewed recommendations with patient, verbalized understanding, will call with any further questions or complaints.  Pt knows to expect a call from sleep clinic.    Bia Cervantes RN  Triage Nurse  06/28/24 10:36 EDT

## 2024-07-09 NOTE — PRE-PROCEDURE INSTRUCTIONS
History updated by phone w/patient. Pt stated he did not  anticoagulant d/t cost. Instructed pt to discuss w/PCP and/or cardiology d/t risk of blood clots/stroke with afib; voiced understanding. Preop instructions reviewed and instructed clears until 2 hrs prior to arrival time dos, voiced understanding. Cardiac clearance in notes.

## 2024-07-12 ENCOUNTER — ANESTHESIA EVENT (OUTPATIENT)
Dept: PERIOP | Facility: HOSPITAL | Age: 74
End: 2024-07-12
Payer: MEDICARE

## 2024-07-12 RX ORDER — SODIUM CHLORIDE 9 MG/ML
40 INJECTION, SOLUTION INTRAVENOUS AS NEEDED
Status: CANCELLED | OUTPATIENT
Start: 2024-07-12

## 2024-07-12 RX ORDER — ONDANSETRON 2 MG/ML
4 INJECTION INTRAMUSCULAR; INTRAVENOUS ONCE AS NEEDED
Status: CANCELLED | OUTPATIENT
Start: 2024-07-12

## 2024-07-12 RX ORDER — SODIUM CHLORIDE 0.9 % (FLUSH) 0.9 %
10 SYRINGE (ML) INJECTION AS NEEDED
Status: CANCELLED | OUTPATIENT
Start: 2024-07-12

## 2024-07-15 ENCOUNTER — HOSPITAL ENCOUNTER (OUTPATIENT)
Facility: HOSPITAL | Age: 74
Setting detail: HOSPITAL OUTPATIENT SURGERY
Discharge: HOME | End: 2024-07-15
Attending: UROLOGY | Admitting: UROLOGY
Payer: MEDICARE

## 2024-07-15 ENCOUNTER — ANESTHESIA (OUTPATIENT)
Dept: PERIOP | Facility: HOSPITAL | Age: 74
End: 2024-07-15
Payer: MEDICARE

## 2024-07-15 VITALS
SYSTOLIC BLOOD PRESSURE: 129 MMHG | RESPIRATION RATE: 13 BRPM | DIASTOLIC BLOOD PRESSURE: 81 MMHG | TEMPERATURE: 98.2 F | BODY MASS INDEX: 34.06 KG/M2 | WEIGHT: 217 LBS | HEART RATE: 58 BPM | OXYGEN SATURATION: 95 %

## 2024-07-15 DIAGNOSIS — N43.3 LEFT HYDROCELE: ICD-10-CM

## 2024-07-15 DIAGNOSIS — N50.1 MALE HEMATOCELE: Primary | ICD-10-CM

## 2024-07-15 DIAGNOSIS — N43.3 HYDROCELE, UNSPECIFIED: ICD-10-CM

## 2024-07-15 PROCEDURE — 25010000002 FENTANYL CITRATE (PF) 50 MCG/ML SOLUTION: Performed by: NURSE ANESTHETIST, CERTIFIED REGISTERED

## 2024-07-15 PROCEDURE — 88305 TISSUE EXAM BY PATHOLOGIST: CPT | Performed by: UROLOGY

## 2024-07-15 PROCEDURE — 25010000002 MIDAZOLAM PER 1MG: Performed by: NURSE ANESTHETIST, CERTIFIED REGISTERED

## 2024-07-15 PROCEDURE — 25010000002 BUPIVACAINE (PF) 0.25 % SOLUTION: Performed by: UROLOGY

## 2024-07-15 PROCEDURE — 25010000002 PROPOFOL 200 MG/20ML EMULSION: Performed by: NURSE ANESTHETIST, CERTIFIED REGISTERED

## 2024-07-15 PROCEDURE — 25810000003 LACTATED RINGERS PER 1000 ML: Performed by: NURSE ANESTHETIST, CERTIFIED REGISTERED

## 2024-07-15 PROCEDURE — 25010000002 CEFAZOLIN PER 500 MG: Performed by: UROLOGY

## 2024-07-15 PROCEDURE — 25010000002 DEXAMETHASONE PER 1 MG: Performed by: NURSE ANESTHETIST, CERTIFIED REGISTERED

## 2024-07-15 PROCEDURE — 25010000002 ONDANSETRON PER 1 MG: Performed by: NURSE ANESTHETIST, CERTIFIED REGISTERED

## 2024-07-15 RX ORDER — DABIGATRAN ETEXILATE 150 MG/1
150 CAPSULE ORAL 2 TIMES DAILY
COMMUNITY

## 2024-07-15 RX ORDER — PROPOFOL 10 MG/ML
INJECTION, EMULSION INTRAVENOUS AS NEEDED
Status: DISCONTINUED | OUTPATIENT
Start: 2024-07-15 | End: 2024-07-15 | Stop reason: SURG

## 2024-07-15 RX ORDER — DEXAMETHASONE SODIUM PHOSPHATE 4 MG/ML
4 INJECTION, SOLUTION INTRA-ARTICULAR; INTRALESIONAL; INTRAMUSCULAR; INTRAVENOUS; SOFT TISSUE ONCE AS NEEDED
Status: COMPLETED | OUTPATIENT
Start: 2024-07-15 | End: 2024-07-15

## 2024-07-15 RX ORDER — FENTANYL CITRATE 50 UG/ML
50 INJECTION, SOLUTION INTRAMUSCULAR; INTRAVENOUS
Status: DISCONTINUED | OUTPATIENT
Start: 2024-07-15 | End: 2024-07-15 | Stop reason: HOSPADM

## 2024-07-15 RX ORDER — ONDANSETRON 4 MG/1
4 TABLET, FILM COATED ORAL DAILY PRN
Qty: 10 TABLET | Refills: 1 | Status: SHIPPED | OUTPATIENT
Start: 2024-07-15

## 2024-07-15 RX ORDER — MIDAZOLAM HYDROCHLORIDE 2 MG/2ML
0.5 INJECTION, SOLUTION INTRAMUSCULAR; INTRAVENOUS
Status: DISCONTINUED | OUTPATIENT
Start: 2024-07-15 | End: 2024-07-15 | Stop reason: HOSPADM

## 2024-07-15 RX ORDER — SODIUM CHLORIDE 9 MG/ML
40 INJECTION, SOLUTION INTRAVENOUS AS NEEDED
Status: DISCONTINUED | OUTPATIENT
Start: 2024-07-15 | End: 2024-07-15 | Stop reason: HOSPADM

## 2024-07-15 RX ORDER — ONDANSETRON 2 MG/ML
4 INJECTION INTRAMUSCULAR; INTRAVENOUS ONCE AS NEEDED
Status: DISCONTINUED | OUTPATIENT
Start: 2024-07-15 | End: 2024-07-15 | Stop reason: HOSPADM

## 2024-07-15 RX ORDER — OXYCODONE HYDROCHLORIDE AND ACETAMINOPHEN 5; 325 MG/1; MG/1
1 TABLET ORAL ONCE AS NEEDED
Status: DISCONTINUED | OUTPATIENT
Start: 2024-07-15 | End: 2024-07-15 | Stop reason: HOSPADM

## 2024-07-15 RX ORDER — FENTANYL CITRATE 50 UG/ML
INJECTION, SOLUTION INTRAMUSCULAR; INTRAVENOUS AS NEEDED
Status: DISCONTINUED | OUTPATIENT
Start: 2024-07-15 | End: 2024-07-15 | Stop reason: SURG

## 2024-07-15 RX ORDER — LIDOCAINE HYDROCHLORIDE 20 MG/ML
INJECTION, SOLUTION INFILTRATION; PERINEURAL AS NEEDED
Status: DISCONTINUED | OUTPATIENT
Start: 2024-07-15 | End: 2024-07-15 | Stop reason: SURG

## 2024-07-15 RX ORDER — FAMOTIDINE 10 MG/ML
20 INJECTION, SOLUTION INTRAVENOUS
Status: COMPLETED | OUTPATIENT
Start: 2024-07-15 | End: 2024-07-15

## 2024-07-15 RX ORDER — MAGNESIUM HYDROXIDE 1200 MG/15ML
LIQUID ORAL AS NEEDED
Status: DISCONTINUED | OUTPATIENT
Start: 2024-07-15 | End: 2024-07-15 | Stop reason: HOSPADM

## 2024-07-15 RX ORDER — SODIUM CHLORIDE, SODIUM LACTATE, POTASSIUM CHLORIDE, CALCIUM CHLORIDE 600; 310; 30; 20 MG/100ML; MG/100ML; MG/100ML; MG/100ML
9 INJECTION, SOLUTION INTRAVENOUS CONTINUOUS PRN
Status: DISCONTINUED | OUTPATIENT
Start: 2024-07-15 | End: 2024-07-15 | Stop reason: HOSPADM

## 2024-07-15 RX ORDER — SODIUM CHLORIDE 0.9 % (FLUSH) 0.9 %
10 SYRINGE (ML) INJECTION EVERY 12 HOURS SCHEDULED
Status: DISCONTINUED | OUTPATIENT
Start: 2024-07-15 | End: 2024-07-15 | Stop reason: HOSPADM

## 2024-07-15 RX ORDER — ONDANSETRON 2 MG/ML
4 INJECTION INTRAMUSCULAR; INTRAVENOUS ONCE AS NEEDED
Status: COMPLETED | OUTPATIENT
Start: 2024-07-15 | End: 2024-07-15

## 2024-07-15 RX ORDER — BACITRACIN ZINC 500 [USP'U]/G
OINTMENT TOPICAL AS NEEDED
Status: DISCONTINUED | OUTPATIENT
Start: 2024-07-15 | End: 2024-07-15 | Stop reason: HOSPADM

## 2024-07-15 RX ORDER — DEXMEDETOMIDINE HYDROCHLORIDE 100 UG/ML
INJECTION, SOLUTION INTRAVENOUS AS NEEDED
Status: DISCONTINUED | OUTPATIENT
Start: 2024-07-15 | End: 2024-07-15 | Stop reason: SURG

## 2024-07-15 RX ORDER — OXYCODONE HYDROCHLORIDE AND ACETAMINOPHEN 5; 325 MG/1; MG/1
1 TABLET ORAL EVERY 6 HOURS PRN
Qty: 30 TABLET | Refills: 0 | Status: SHIPPED | OUTPATIENT
Start: 2024-07-15

## 2024-07-15 RX ORDER — BUPIVACAINE HYDROCHLORIDE 2.5 MG/ML
INJECTION, SOLUTION EPIDURAL; INFILTRATION; INTRACAUDAL AS NEEDED
Status: DISCONTINUED | OUTPATIENT
Start: 2024-07-15 | End: 2024-07-15 | Stop reason: HOSPADM

## 2024-07-15 RX ORDER — SODIUM CHLORIDE, SODIUM LACTATE, POTASSIUM CHLORIDE, CALCIUM CHLORIDE 600; 310; 30; 20 MG/100ML; MG/100ML; MG/100ML; MG/100ML
100 INJECTION, SOLUTION INTRAVENOUS ONCE
Status: DISCONTINUED | OUTPATIENT
Start: 2024-07-15 | End: 2024-07-15 | Stop reason: HOSPADM

## 2024-07-15 RX ADMIN — PROPOFOL 170 MG: 10 INJECTION, EMULSION INTRAVENOUS at 15:06

## 2024-07-15 RX ADMIN — SODIUM CHLORIDE, POTASSIUM CHLORIDE, SODIUM LACTATE AND CALCIUM CHLORIDE 9 ML/HR: 600; 310; 30; 20 INJECTION, SOLUTION INTRAVENOUS at 13:23

## 2024-07-15 RX ADMIN — DEXAMETHASONE SODIUM PHOSPHATE 4 MG: 4 INJECTION INTRA-ARTICULAR; INTRALESIONAL; INTRAMUSCULAR; INTRAVENOUS; SOFT TISSUE at 13:23

## 2024-07-15 RX ADMIN — MIDAZOLAM HYDROCHLORIDE 0.5 MG: 1 INJECTION, SOLUTION INTRAMUSCULAR; INTRAVENOUS at 14:40

## 2024-07-15 RX ADMIN — DEXMEDETOMIDINE 8 MCG: 100 INJECTION, SOLUTION, CONCENTRATE INTRAVENOUS at 15:32

## 2024-07-15 RX ADMIN — FENTANYL CITRATE 50 MCG: 50 INJECTION, SOLUTION INTRAMUSCULAR; INTRAVENOUS at 15:40

## 2024-07-15 RX ADMIN — DEXMEDETOMIDINE 8 MCG: 100 INJECTION, SOLUTION, CONCENTRATE INTRAVENOUS at 15:13

## 2024-07-15 RX ADMIN — LIDOCAINE HYDROCHLORIDE 100 MG: 20 INJECTION, SOLUTION INFILTRATION; PERINEURAL at 15:06

## 2024-07-15 RX ADMIN — DEXMEDETOMIDINE 8 MCG: 100 INJECTION, SOLUTION, CONCENTRATE INTRAVENOUS at 15:41

## 2024-07-15 RX ADMIN — ONDANSETRON 4 MG: 2 INJECTION INTRAMUSCULAR; INTRAVENOUS at 13:23

## 2024-07-15 RX ADMIN — FENTANYL CITRATE 50 MCG: 50 INJECTION, SOLUTION INTRAMUSCULAR; INTRAVENOUS at 15:13

## 2024-07-15 RX ADMIN — FAMOTIDINE 20 MG: 10 INJECTION, SOLUTION INTRAVENOUS at 13:23

## 2024-07-15 RX ADMIN — CEFAZOLIN 2000 MG: 2 INJECTION, POWDER, FOR SOLUTION INTRAVENOUS at 15:11

## 2024-07-15 NOTE — H&P
[facility] History [facility] Physical    Patient Care Team:  Ming Thompson MD as PCP - General (Family Medicine)    Chief complaint swelling    Subjective     Patient is a 73 y.o. male presents with large and symptomatic left hydrocele discomfort was sitting heaviness with standing no history of trauma.   History of prostate cancer post HIFU stable    Review of Systems   Pertinent items are noted in HPI    Past Medical History:   Diagnosis Date    Atrial fibrillation     Dr Bolivar, clearance in notes    Cancer     PROSTATE    Hard of hearing     BILATERAL    History of hepatitis B     Hyperlipidemia     Hypertension     Kidney stone      Past Surgical History:   Procedure Laterality Date    COLONOSCOPY      EXTRACORPOREAL SHOCK WAVE LITHOTRIPSY (ESWL) Right 2018    Procedure: RIGHT EXTRACORPOREAL SHOCKWAVE LITHOTRIPSY;  Surgeon: Kevin Manzano MD;  Location: [facility];  Service: Urology    EXTRACORPOREAL SHOCK WAVE LITHOTRIPSY (ESWL) Right 2018    Procedure: RIGHT EXTRACORPOREAL SHOCKWAVE LITHOTRIPSY;  Surgeon: Yehuda Lindsay MD;  Location: [facility];  Service: Urology    EYE SURGERY Bilateral     tear ducts    HEMORRHOIDECTOMY       Family History   Problem Relation Age of Onset    Malig Hyperthermia Neg Hx      Social History     Tobacco Use    Smoking status: Former     Current packs/day: 0.00     Types: Cigarettes     Quit date:      Years since quittin.5    Smokeless tobacco: Never   Vaping Use    Vaping status: Never Used   Substance Use Topics    Alcohol use: Not Currently     Comment: 4-6 cans beer every night    Drug use: No       Meds:  No medications prior to admission.       Allergies:  Patient has no known allergies.    Debilities:  None    Objective     Vital Signs     No intake or output data in the 24 hours ending 07/15/24 1121       Physical Exam:      General Appearance:  Alert, cooperative, in no acute distress   Head:  Normocephalic, without obvious  abnormality, atraumatic   Eyes:  Lids and lashes normal, conjunctivae and sclerae normal, no icterus, no pallor, corneas clear   Ears:  Ears appear intact with no abnormalities noted   Throat:     Neck:  No adenopathy, supple, trachea midline, no thyromegaly, no JVD   Back:  No kyphosis present, no scoliosis present, no skin lesions, erythema or scars, no tenderness to percussion or palpation, range of motion normal   Lungs:   respirations regular, even and unlabored    Heart:  Regular rhythm and normal rate   Chest Wall:  No abnormalities observed   Abdomen:   no masses, no organomegaly, soft non-tender, non-distended, no guarding, no rebound tenderness   Rectal:  Without masses prostate flat    14 cm left hydrocele right testicle is normal normal cord structures   Extremities:     Pulses:     Skin:     Lymph nodes:     Neurologic:                                                                               Results Review:    I reviewed the patient's new clinical results.  Results for orders placed or performed during the hospital encounter of 06/26/24   Adult Transthoracic Echo Complete w/ Color, Spectral and Contrast if necessary per protocol   Result Value Ref Range    EF(MOD-bp) 60.7 %    LVIDd 4.8 cm    LVIDs 3.4 cm    IVSd 1.10 cm    LVPWd 1.10 cm    FS 29.5 %    IVS/LVPW 1.00 cm    ESV(cubed) 38.8 ml    LV Sys Vol (BSA corrected) 18.2 cm2    EDV(cubed) 110.6 ml    LV Jones Vol (BSA corrected) 46.0 cm2    LV mass(C)d 194.0 grams    LVOT area 3.2 cm2    LVOT diam 2.00 cm    EDV(MOD-sp2) 90.0 ml    EDV(MOD-sp4) 96.0 ml    ESV(MOD-sp2) 36.0 ml    ESV(MOD-sp4) 38.0 ml    SV(MOD-sp2) 54.0 ml    SV(MOD-sp4) 58.0 ml    SVi(MOD-SP2) 25.9 ml/m2    SVi(MOD-SP4) 27.8 ml/m2    SVi (LVOT) 31.1 ml/m2    EF(MOD-sp2) 60.0 %    EF(MOD-sp4) 60.4 %    LA ESV Index (BP) 42.6 ml/m2    TR max gilma 264.7 cm/sec    SV(LVOT) 65.0 ml    SV(RVOT) 86.1 ml    Qp/Qs 1.33     RV Base 4.3 cm    RV Mid 3.7 cm    RV Length 8.2 cm    RV S' 17.4  cm/sec    LV V1 max 108.0 cm/sec    LV V1 max PG 4.7 mmHg    LV V1 mean PG 2.00 mmHg    LV V1 VTI 20.6 cm    Ao pk gilma 146.0 cm/sec    Ao max PG 8.5 mmHg    Ao mean PG 5.0 mmHg    Ao V2 VTI 25.6 cm    MAGNO(I,D) 2.5 cm2    MV max PG 4.3 mmHg    MV mean PG 1.02 mmHg    MV V2 VTI 20.8 cm    MV P1/2t 42.5 msec    MVA(P1/2t) 5.2 cm2    MVA(VTI) 3.1 cm2    MV dec slope 781.1 cm/sec2    TR max PG 28.0 mmHg    RVOT diam 2.7 cm    RV V1 max PG 2.41 mmHg    RV V1 max 77.6 cm/sec    RV V1 VTI 14.8 cm    PA V2 max 115.0 cm/sec    PA acc time 0.10 sec    ACS 1.93 cm    Sinus 3.4 cm    Dimensionless Index 0.70 (DI)    RVSP(TR) 31 mmHg    RAP systole 3 mmHg    Ascending aorta 3.8 cm        Assessment:  Symptomatic enlarging left hydrocele    Plan:    Left hydrocele repair R-B-O discussed with patient not limited to infection bleeding recurrence pain discomfort possibly orchiectomy testicular atrophy and injury the patient understands agrees to proceed    I discussed the patient's findings and my recommendations with patient.     Yehuda Lindsay MD  07/15/24  11:21 EDT

## 2024-07-15 NOTE — ANESTHESIA POSTPROCEDURE EVALUATION
Patient: Christiano Sandoval    Procedure Summary       Date: 07/15/24 Room / Location:  [facility] OR [facility] /  [facility]    Anesthesia Start: 1501 Anesthesia Stop: 1618    Procedure: LEFT HYDROCELE-HEMATOCELE REMOVAL AND SCROTAL ORCHIECTOMY (Left: Scrotum) Diagnosis: (N43.3)    Surgeons: Yehuda Lindsay MD Provider: Radha Maradiaga CRNA    Anesthesia Type: general ASA Status: 3            Anesthesia Type: general    Vitals  Vitals Value Taken Time   /86 07/15/24 1645   Temp 97.5 °F (36.4 °C) 07/15/24 1614   Pulse 61 07/15/24 1646   Resp 11 07/15/24 1645   SpO2 94 % 07/15/24 1646   Vitals shown include unfiled device data.        Post Anesthesia Care and Evaluation    Patient location during evaluation: PHASE II  Patient participation: complete - patient participated  Level of consciousness: awake and alert  Pain score: 0  Pain management: satisfactory to patient    Airway patency: patent  Anesthetic complications: No anesthetic complications  PONV Status: none  Cardiovascular status: acceptable  Respiratory status: acceptable  Hydration status: acceptable

## 2024-07-15 NOTE — INTERVAL H&P NOTE
H&P reviewed. The patient was examined and there are no changes to the H&P.      /87 (BP Location: Right arm, Patient Position: Lying)   Pulse 78   Temp 97.9 °F (36.6 °C) (Oral)   Resp 17   Wt 98.4 kg (217 lb)   SpO2 98%   BMI 34.06 kg/m²

## 2024-07-15 NOTE — ANESTHESIA PREPROCEDURE EVALUATION
Anesthesia Evaluation     Patient summary reviewed and Nursing notes reviewed   no history of anesthetic complications:   NPO Solid Status: > 8 hours  NPO Liquid Status: > 6 hours           Airway   Mallampati: III  TM distance: <3 FB  Neck ROM: full  Possible difficult intubation and Large neck circumference  Dental          Pulmonary     breath sounds clear to auscultation  (+) a smoker Former,  Cardiovascular   Exercise tolerance: good (4-7 METS)    ECG reviewed  PT is on anticoagulation therapy  Patient on routine beta blocker and Beta blocker given within 24 hours of surgery  Rhythm: irregular  Rate: abnormal    (+) hypertension well controlled less than 2 medications, dysrhythmias (Dr Bolivar, clearance in notes) Atrial Fib, hyperlipidemia    ROS comment: New A[facility]fib diagnosis at [facility] earlier in June. Has since been seen by cardiology.    Last dose of dabigatran 7/13/24    EKG 6/3/24  HEART RATE= 70  bpm  RR Interval= 861  ms  LA Interval=   ms  P Horizontal Axis=   deg  P Front Axis=   deg  QRSD Interval= 89  ms  QT Interval= 405  ms  QTcB= 436  ms  QRS Axis= 61  deg  T Wave Axis= 30  deg  - ABNORMAL ECG -  Atrial fibrillation - new  Electronically Signed By: Andreina Calles ([facility]) 04-Jun-2024 17:48:27  Date and Time of Study: 2024-06-03 10:25:30    TTE 6/26/24  -Left ventricular systolic function is normal. Calculated left ventricular EF = 60.7%  -Left ventricular diastolic function was normal.  -The right ventricular cavity is moderately dilated.  -The left atrial cavity is moderate to severely dilated.  -Left atrial volume is severely increased.  -The right atrial cavity is severely  dilated.  -Peak velocity of the flow distal to the aortic valve is 146 cm/s. Aortic valve maximum pressure gradient is 9 mmHg. Aortic valve mean pressure gradient is 5 mmHg. Aortic valve dimensionless index is 0.7 .  -Mild to moderate mitral valve regurgitation is present.  -Mild tricuspid valve regurgitation is  present.  -Estimated right ventricular systolic pressure from tricuspid regurgitation is normal (<35 mmHg). Calculated right ventricular systolic pressure from tricuspid regurgitation is 31 mmHg.    PE comment: A-fib between 60-80    Neuro/Psych- negative ROS  GI/Hepatic/Renal/Endo    (+) obesity, liver disease, renal disease (RIGHT EXTRACORPOREAL SHOCKWAVE LITHOTRIPSY (Right))- stones    ROS Comment: Reports previous Hepatitis B infection 25 years ago    Previous lithotripsy x2    Musculoskeletal (-) negative ROS    Abdominal   (+) obese    Abdomen: soft.  Bowel sounds: normal.   Substance History   (+) alcohol use      Comment: 2-6 beers daily   OB/GYN negative ob/gyn ROS         Other      history of cancer remission      Other Comment: Previous HIFU approx. 10 years ago.  ROS/Med Hx Other: [place]                Anesthesia Plan    ASA 3     general     intravenous induction     Anesthetic plan, risks, benefits, and alternatives have been provided, discussed and informed consent has been obtained with: patient and spouse/significant other.  Pre-procedure education provided  Use of blood products discussed with patient and spouse/significant other  Consented to blood products.    Plan discussed with CRNA.      CODE STATUS:

## 2024-07-15 NOTE — BRIEF OP NOTE
HYDROCELECTOMY  Progress Note    Christiano Sandoval  7/15/2024    Pre-op Diagnosis:   N43.3       Post-Op Diagnosis Codes:  Complex left hydrocele-hematocele atrophic testicle    Procedure/CPT® Codes:        Procedure(s):  LEFT COMPLEX HEMO/HYDROCELECTOMY ORCHIECTOMY UNILATERAL              Surgeon(s):  Yehuda Lindsay MD    Anesthesia: General    Staff:   Circulator: Clarissa Tripathi RN  Scrub Person: Jhonatan Burnham         Estimated Blood Loss: 10 mL    Urine Voided: * No values recorded between 7/15/2024  3:00 PM and 7/15/2024  3:52 PM *    Specimens:                Specimens       ID Source Type Tests Collected By Collected At Frozen?    A Testis, Left Tissue TISSUE PATHOLOGY EXAM   Yehuda Lindsay MD 7/15/24 [id number]     Description: testicle, cord, and sac    This specimen was not marked as sent.                  Drains: * No LDAs found *    Findings: Scarred complex hematocele-hydrocele atrophic testicle attached to the complex removed no hernia appreciated        Complications: None          Yehuda Lindsay MD     Date: 7/15/2024  Time: 16:06 EDT

## 2024-07-15 NOTE — ANESTHESIA PROCEDURE NOTES
Airway  Urgency: elective    Date/Time: 7/15/2024 3:07 PM  End Time:7/15/2024 3:08 PM  Airway not difficult    General Information and Staff    Patient location during procedure: OR    Indications and Patient Condition  Indications for airway management: airway protection    Preoxygenated: yes  Mask difficulty assessment: 0 - not attempted    Final Airway Details  Final airway type: supraglottic airway      Successful airway: unique  Size 4     Number of attempts at approach: 1  Assessment: lips, teeth, and gum same as pre-op

## 2024-07-17 LAB
LAB AP CASE REPORT: NORMAL
PATH REPORT.FINAL DX SPEC: NORMAL
PATH REPORT.GROSS SPEC: NORMAL

## 2024-08-13 ENCOUNTER — OFFICE VISIT (OUTPATIENT)
Dept: SLEEP MEDICINE | Facility: HOSPITAL | Age: 74
End: 2024-08-13
Payer: MEDICARE

## 2024-08-13 VITALS — WEIGHT: 214 LBS | HEART RATE: 67 BPM | BODY MASS INDEX: 33.59 KG/M2 | HEIGHT: 67 IN | OXYGEN SATURATION: 95 %

## 2024-08-13 DIAGNOSIS — E66.09 CLASS 1 OBESITY DUE TO EXCESS CALORIES WITHOUT SERIOUS COMORBIDITY WITH BODY MASS INDEX (BMI) OF 33.0 TO 33.9 IN ADULT: ICD-10-CM

## 2024-08-13 DIAGNOSIS — E78.9 ABNORMAL SERUM CHOLESTEROL: ICD-10-CM

## 2024-08-13 DIAGNOSIS — I51.7 RIGHT VENTRICULAR DILATION: Primary | ICD-10-CM

## 2024-08-13 DIAGNOSIS — G47.10 HYPERSOMNOLENCE: ICD-10-CM

## 2024-08-13 DIAGNOSIS — G47.33 OSA (OBSTRUCTIVE SLEEP APNEA): ICD-10-CM

## 2024-08-13 PROBLEM — E66.811 CLASS 1 OBESITY DUE TO EXCESS CALORIES WITHOUT SERIOUS COMORBIDITY WITH BODY MASS INDEX (BMI) OF 33.0 TO 33.9 IN ADULT: Status: ACTIVE | Noted: 2024-08-13

## 2024-08-13 PROCEDURE — G0463 HOSPITAL OUTPT CLINIC VISIT: HCPCS

## 2024-08-13 PROCEDURE — 1159F MED LIST DOCD IN RCRD: CPT | Performed by: PSYCHIATRY & NEUROLOGY

## 2024-08-13 PROCEDURE — 1160F RVW MEDS BY RX/DR IN RCRD: CPT | Performed by: PSYCHIATRY & NEUROLOGY

## 2024-08-13 PROCEDURE — 99204 OFFICE O/P NEW MOD 45 MIN: CPT | Performed by: PSYCHIATRY & NEUROLOGY

## 2024-08-13 NOTE — PROGRESS NOTES
Reason for Consultation: Sleep apnea        Patient Care Team:  Ming Thompson MD as PCP - General (Family Medicine)  Myrna Guadarrama MD, MPH as Consulting Physician (Sleep Medicine)      History of present illness:    Thank you for asking me to see your patient.  The patient is a 73 y.o. male he is here because he has been found to have right ventricular dilatation and hypersomnolence.  There is concern that he might have sleep apnea although he tells me that his wife does not report that he snores significantly.  He is retired and says he is not very active these days.  He does not have a firm schedule for bedtime or getting up.  He averages sleep for 6 or 7 hours and he says he feels rested when he wakes up.  He goes to the bathroom 2-3 times per night and he attributes this to prostate surgery.  Started use tobacco at age 18 and stopped at 28 is 2-4 beers per week and 2 cups of coffee per day.  He is at the visit unaccompanied.    [place]: 3    Data Reviewed: Reviewed his questionnaire and medical chart.      PMH:  Past Medical History:   Diagnosis Date    Atrial fibrillation     Dr Bolivar, clearance in notes    Cancer     PROSTATE    Hard of hearing     BILATERAL    History of hepatitis B     Hyperlipidemia     Hypertension     Kidney stone           Allergies:  Patient has no known allergies.     Medication Review:   Current Outpatient Medications on File Prior to Visit   Medication Sig Dispense Refill    atorvastatin (LIPITOR) 10 MG tablet Take 1 tablet by mouth Daily.      dabigatran etexilate (PRADAXA) 150 MG capsu Take 1 capsule by mouth 2 (Two) Times a Day.      metoprolol succinate XL (TOPROL-XL) 25 MG 24 hr tablet Take 1 tablet by mouth Daily.      Multiple Vitamins-Minerals (MULTIVITAL PO) Take 1 tablet by mouth Daily. HOLD FOR SURGERY      ondansetron (Zofran) 4 MG tablet Take 1 tablet by mouth Daily As Needed for Nausea or Vomiting. 10 tablet 1    oxyCODONE-acetaminophen (PERCOCET) 5-325 MG per  "tablet Take 1 tablet by mouth Every 6 (Six) Hours As Needed for Pain. 30 tablet 0     No current facility-administered medications on file prior to visit.         Vital Signs:    Vitals:    08/13/24 1300   Pulse: 67   SpO2: 95%   Weight: 97.1 kg (214 lb)   Height: 170 cm (66.93\")        Body mass index is 33.59 kg/m².  Neck Circumference: 16.5 inches  .BMIFOLLOWUP  BMI is >= 30 and <35. (Class 1 Obesity). The following options were offered after discussion;: referral to primary care      Physical Exam:    Constitutional:  Well developed 73 y.o. male that appears in no apparent distress.  Awake & oriented times 3.  Normal mood with normal recent and remote memory and normal judgement.  Eyes:  Conjunctivae normal.  Oropharynx: Moist mucous membranes without exudate and Mallampati 4  Neck: Trachea midline  Respiratory: Effort is not labored  Cardiovascular: Radial pulse regular  Musculoskeletal: Gait appears normal, no digital clubbing evident, no pre-tibial edema        Impression:   Encounter Diagnoses   Name Primary?    Right ventricular dilation Yes    Hypersomnolence     JENNIFER (obstructive sleep apnea)     Abnormal serum cholesterol     Class 1 obesity due to excess calories without serious comorbidity with body mass index (BMI) of 33.0 to 33.9 in adult      Patient's BMI is Body mass index is 33.59 kg/m².    I think is more likely than not that he has significant sleep apnea and I think home sleep apnea test is appropriate.    Plan:    Home sleep apnea test    The patient should practice good sleep hygiene measures.      Weight loss might be beneficial in this patient who has a Body mass index is 33.59 kg/m².      Pathophysiology of JENNIFER described to the patient.  Cardiovascular complications of untreated JENNIFER also reviewed.      The patient was cautioned about the dangers of drowsy driving.    Kem Guadarrama MD  Sleep Medicine  08/13/24  13:15 EDT         "

## 2024-08-16 ENCOUNTER — HOSPITAL ENCOUNTER (OUTPATIENT)
Dept: SLEEP MEDICINE | Facility: HOSPITAL | Age: 74
Discharge: HOME | End: 2024-08-16
Admitting: PSYCHIATRY & NEUROLOGY
Payer: MEDICARE

## 2024-08-16 DIAGNOSIS — G47.10 HYPERSOMNOLENCE: ICD-10-CM

## 2024-08-16 DIAGNOSIS — G47.33 OSA (OBSTRUCTIVE SLEEP APNEA): ICD-10-CM

## 2024-08-16 DIAGNOSIS — E78.9 ABNORMAL SERUM CHOLESTEROL: ICD-10-CM

## 2024-08-16 PROCEDURE — 95806 SLEEP STUDY UNATT&RESP EFFT: CPT

## 2024-08-22 DIAGNOSIS — E66.09 CLASS 1 OBESITY DUE TO EXCESS CALORIES WITHOUT SERIOUS COMORBIDITY WITH BODY MASS INDEX (BMI) OF 33.0 TO 33.9 IN ADULT: Primary | ICD-10-CM

## 2024-08-22 DIAGNOSIS — G47.33 OSA (OBSTRUCTIVE SLEEP APNEA): ICD-10-CM

## 2024-08-26 ENCOUNTER — TELEPHONE (OUTPATIENT)
Dept: SLEEP MEDICINE | Facility: HOSPITAL | Age: 74
End: 2024-08-26
Payer: MEDICARE

## 2024-08-26 NOTE — TELEPHONE ENCOUNTER
..Spoke with patient about sleep study results , sending orders to [facility], compliance follow up will be scheduled once set up on CPAP.

## 2024-11-20 NOTE — OP NOTE
Orders for admission, patient is aware of plan and ready to go upstairs. Any questions, please call ED MAGGI Armstrong at extension [phone].     Patient Covid vaccination status: Fully vaccinated     COVID Test Ordered in ED: SARS-CoV-2/Flu A and B/RSV by PCR (GeneXpert)    COVID Suspicion at Admission: N/A    Running Infusions:  None    Mental Status/LOC at time of transport: a/o x 4    Other pertinent information: Pt is from home alone. PT is independent with ambulation.  CIWA score: N/A   NIH score:  N/A         Preoperative diagnosis symptomatic enlarging left hydrocele    Postoperative diagnosis complex left hematocele-hydrocele atrophic testicle incorporated in the complex mass    Operative procedure is complex left hydrocele-hematocele removal and left scrotal orchiectomy    Surgeon Neftali    Anesthesia General Quarter percent Marcaine local    Blood loss 10 cc    Procedure note 73-year-old gentleman plagued by progressive enlarging left hydrocele on anticoagulation therapy symptomatic undergo above-mentioned procedure he has been off Xarelto preoperatively    Site was marked antibiotics given timeout was taken COVID precautions no known drug allergies after adequate anesthesia he was shaved over his left hemiscrotum prepped with Betadine draped sterile fashion infiltration of the cord with quarter percent plain Marcaine subsequent left hemiscrotal incision was made encountering a marked amount of thick tissue with continued dissection carefully with Bovie providing hemostasis as able to find a avascular plane carried down inferiorly where there is great amount of adherence this was carefully dissected out on size able to do that high encountered a multiloculated complex cystic-hydrocele mass with incision producing a large amount of old blood as this is been encountered I was able to carry and free up the cord completely no hernias were encountered I divided into stocks and each were individually ligated with a 2-0 Vicryl suture legs hemostasis checked and verified infiltration quarter percent plain Marcaine    Copious irrigation greater liter of fluid no signs of any active bleeding stab wound at the most dependent portion of the scrotum followed by a 1 inch Penrose drain anchored with a 2-0 Vicryl to the skin closed in 2 layers with 4-0 chromic with fluff gauze bacitracin ointment fluff gauze sponges scrotal support and ice pack in recovery room findings discussed with the family    Disposition also call for  follow-up appointment in 1 week maintaining off Xarelto into the years no signs of active bleeding for 24 hours he will be given supplies for wound management additional medications include Percocet and Zofran instruction sheet with phone numbers wound management

## 2024-12-11 ENCOUNTER — OFFICE VISIT (OUTPATIENT)
Dept: CARDIOLOGY | Facility: CLINIC | Age: 74
End: 2024-12-11
Payer: MEDICARE

## 2024-12-11 VITALS
SYSTOLIC BLOOD PRESSURE: 126 MMHG | HEIGHT: 67 IN | HEART RATE: 81 BPM | DIASTOLIC BLOOD PRESSURE: 82 MMHG | OXYGEN SATURATION: 96 % | WEIGHT: 222.4 LBS | BODY MASS INDEX: 34.91 KG/M2

## 2024-12-11 DIAGNOSIS — I48.19 PERSISTENT ATRIAL FIBRILLATION: Primary | ICD-10-CM

## 2024-12-11 NOTE — PATIENT INSTRUCTIONS
Since you are over the age of 73, unfortunately Pradaxa medication can sometimes have an increased bleeding risk.  It is a good blood thinner to be on, but there are safer alternatives.    I think we can stop this medication and replace it with a drug called Eliquis.  This is a 5 mg tablet taken twice per day.  This drug might be more expensive depending on your insurance plan.  However, next year they have changed some of the Medicare cost guidelines to where the out-of-pocket cost should be less.    I would like for you to fill the Eliquis prescription, and start taking it today and replace the Pradaxa.  You will get 30 days for free.  Ask the pharmacist about what an approximate cost of this medication will be for you next year.  If you find that the Eliquis is still going to be prohibitively expensive, let us know and we can proceed with some paperwork to see if we get the drug covered through the  through patient assistance forms.

## 2024-12-11 NOTE — PROGRESS NOTES
[facility]    Subjective:     Encounter Date:12/11/24      Patient ID: Christiano Sandoval is a 74 y.o. male.    Chief Complaint:   Chief Complaint   Patient presents with    Follow-up      History of Present Illness    Mr. Sandoval is a pleasant 74 y.o. gentleman past medical history hyperlipidemia, left-sided hydrocele, who presents for further evaluation after abnormal preoperative cardiac testing revealed atrial fibrillation.  Patient has no symptoms, he is not aware of any irregular heartbeat, and he states that he is otherwise felt pretty well.     He was noted to have A-fib on his testing, and he saw his PCP who initiated him on Xarelto therapy, as well as metoprolol succinate.  He had no symptoms from his A-fib and was unaware of it.    He underwent echocardiogram which showed normal LVEF and dilated left atrial chambers.    He was initially started on Xarelto, but he went to the pharmacy and said it would cost him 500 hours.  He is unclear if this would be a total cost, or his co-pay, and he was unsure overall.    However, given the increased cost, he mention this to his PCP and was placed on Pradaxa.  He is tolerating Pradaxa without incident.    Previous card testing:  Echocardiogram June 2024    Left ventricular systolic function is normal. Calculated left ventricular EF = 60.7%    Left ventricular diastolic function was normal.    The right ventricular cavity is moderately dilated.    The left atrial cavity is moderate to severely dilated.    Left atrial volume is severely increased.    The right atrial cavity is severely  dilated.    Peak velocity of the flow distal to the aortic valve is 146 cm/s. Aortic valve maximum pressure gradient is 9 mmHg. Aortic valve mean pressure gradient is 5 mmHg. Aortic valve dimensionless index is 0.7 .    Mild to moderate mitral valve regurgitation is present.    Mild tricuspid valve regurgitation is present.    Estimated right ventricular systolic  "pressure from tricuspid regurgitation is normal (<35 mmHg). Calculated right ventricular systolic pressure from tricuspid regurgitation is 31 mmHg.    The following portions of the patient's history were reviewed and updated as appropriate: allergies, current medications, past family history, past medical history, past social history, past surgical history and problem list.    Past Medical History:   Diagnosis Date    Atrial fibrillation     [place] in notes    Cancer     PROSTATE    Hard of hearing     BILATERAL    History of hepatitis B     Hyperlipidemia     Hypertension     Kidney stone        Past Surgical History:   Procedure Laterality Date    COLONOSCOPY      EXTRACORPOREAL SHOCK WAVE LITHOTRIPSY (ESWL) Right 11/02/2018    Procedure: RIGHT EXTRACORPOREAL SHOCKWAVE LITHOTRIPSY;  Surgeon: Kevin Manzano MD;  Location: [facility] OR [facility];  Service: Urology    EXTRACORPOREAL SHOCK WAVE LITHOTRIPSY (ESWL) Right 12/21/2018    Procedure: RIGHT EXTRACORPOREAL SHOCKWAVE LITHOTRIPSY;  Surgeon: Yehuda Lindsay MD;  Location: [facility] OR [facility];  Service: Urology    EYE SURGERY Bilateral     tear ducts    HEMORRHOIDECTOMY      HYDROCELECTOMY Left 7/15/2024    Procedure: LEFT HYDROCELE-HEMATOCELE REMOVAL AND SCROTAL ORCHIECTOMY;  Surgeon: Yehuda Lindsay MD;  Location: [facility];  Service: Urology;  Laterality: Left;         Procedures       Objective:     Vitals:    12/11/24 1018   BP: 126/82   Pulse: 81   SpO2: 96%   Weight: 101 kg (222 lb 6.4 oz)   Height: 170.2 cm (67\")         Constitutional:       Appearance: Healthy appearance. Not in distress.   Neck:      Vascular: JVD normal.   Pulmonary:      Effort: Pulmonary effort is normal.      Breath sounds: Normal breath sounds.   Cardiovascular:      PMI at left midclavicular line. Normal rate. Irregularly irregular rhythm. Normal S2.       Murmurs: There is no murmur.   Pulses:     Intact distal pulses.   Edema:     Peripheral edema absent.   Skin:     " General: Skin is warm and dry.   Neurological:      General: No focal deficit present.      Mental Status: Alert, oriented to person, place, and time and oriented to person, place and time.   Psychiatric:         Mood and Affect: Mood and affect normal.         Lab Review:       BUN   Date Value Ref Range Status   06/03/2024 14 8 - 23 mg/dL Final     Creatinine   Date Value Ref Range Status   06/03/2024 0.88 0.76 - 1.27 mg/dL Final   10/08/2020 1.10 0.60 - 1.30 mg/dL Final     Comment:     Serial Number: [id number]:  [id number]   10/30/2018 0.9 0.6 - 1.3 mg/dL Final     Potassium   Date Value Ref Range Status   06/03/2024 4.6 3.5 - 5.2 mmol/L Final     ALT (SGPT)   Date Value Ref Range Status   02/29/2024 15 1 - 41 U/L Final     Comment:     Appended report. These results have been appended to a previously preliminary verified report.     AST (SGOT)   Date Value Ref Range Status   02/29/2024 16 1 - 40 U/L Final     Comment:     Appended report. These results have been appended to a previously preliminary verified report.         Performed        Assessment:          Diagnosis Plan   1. Persistent atrial fibrillation  Ambulatory Referral to Cardiac Electrophysiology    apixaban (ELIQUIS) 5 MG tablet tablet               Plan:         Atrial fibrillation: Asymptomatic.  Rate is currently controlled.  Xarelto had a high out-of-pocket cost, patient was unsure but it sounds like it was probably his deductible.  He was started on Pradaxa, however given his age, and very mild CKD, I think the bleeding risk is Pradaxa is higher than I would like.  I am going to give him a prescription for Eliquis 5 twice daily with free 30-day trial.  He can start taking this and replace the Pradaxa tonight.  We did discuss the changes in Medicare part D plans for next year and how this medication may be affected.  He is going to talk to the pharmacist about his out-of-pocket cost next year but we did discuss that with his increased  bleeding risk I think Eliquis would be a better alternative moving forward.  [id number] 2  He remains asymptomatic, however he does have some dilated atrial cavities.  He is otherwise pretty healthy for 74.  He inquires about an ablation.  Although he is not having significant symptoms, he does have some dilated atrial cavities perhaps he could benefit from rhythm control.  I am going to refer to cardiac EP for consideration of an ablation, if anything else perhaps if he goes in sinus rhythm and stays in that he can come off blood thinners longer-term.  He is on metoprolol succinate 25 daily, denies side effects.  Continue  Hyperlipidemia: On statin therapy, per PCP.      RTC 6 months.  Starting Eliquis in the interim.    Ignacio Durán MD  [facility]  12/11/24  10:14 EDT       Current Outpatient Medications:     atorvastatin (LIPITOR) 10 MG tablet, Take 1 tablet by mouth Daily., Disp: , Rfl:     metoprolol succinate XL (TOPROL-XL) 25 MG 24 hr tablet, Take 1 tablet by mouth Daily., Disp: , Rfl:     Multiple Vitamins-Minerals (MULTIVITAL PO), Take 1 tablet by mouth Daily. HOLD FOR SURGERY, Disp: , Rfl:     apixaban (ELIQUIS) 5 MG tablet tablet, Take 1 tablet by mouth 2 (Two) Times a Day., Disp: 60 tablet, Rfl: 11    ondansetron (Zofran) 4 MG tablet, Take 1 tablet by mouth Daily As Needed for Nausea or Vomiting. (Patient not taking: Reported on 12/11/2024), Disp: 10 tablet, Rfl: 1    oxyCODONE-acetaminophen (PERCOCET) 5-325 MG per tablet, Take 1 tablet by mouth Every 6 (Six) Hours As Needed for Pain. (Patient not taking: Reported on 12/11/2024), Disp: 30 tablet, Rfl: 0         Return in about 6 months (around 6/11/2025).      Part of this note may be an electronic transcription/translation of spoken language to printed text using the Dragon Dictation System.

## 2024-12-13 ENCOUNTER — OFFICE VISIT (OUTPATIENT)
Dept: SLEEP MEDICINE | Facility: HOSPITAL | Age: 74
End: 2024-12-13
Payer: MEDICARE

## 2024-12-13 VITALS
WEIGHT: 215 LBS | DIASTOLIC BLOOD PRESSURE: 93 MMHG | HEIGHT: 67 IN | HEART RATE: 56 BPM | BODY MASS INDEX: 33.74 KG/M2 | OXYGEN SATURATION: 99 % | SYSTOLIC BLOOD PRESSURE: 143 MMHG

## 2024-12-13 DIAGNOSIS — E66.811 CLASS 1 OBESITY DUE TO EXCESS CALORIES WITHOUT SERIOUS COMORBIDITY WITH BODY MASS INDEX (BMI) OF 33.0 TO 33.9 IN ADULT: Primary | ICD-10-CM

## 2024-12-13 DIAGNOSIS — E66.09 CLASS 1 OBESITY DUE TO EXCESS CALORIES WITHOUT SERIOUS COMORBIDITY WITH BODY MASS INDEX (BMI) OF 33.0 TO 33.9 IN ADULT: Primary | ICD-10-CM

## 2024-12-13 DIAGNOSIS — I1A.0 RESISTANT HYPERTENSION: ICD-10-CM

## 2024-12-13 DIAGNOSIS — G47.33 OSA ON CPAP: ICD-10-CM

## 2024-12-13 PROBLEM — I10 HYPERTENSION: Status: ACTIVE | Noted: 2024-12-13

## 2024-12-13 PROCEDURE — G0463 HOSPITAL OUTPT CLINIC VISIT: HCPCS

## 2024-12-13 NOTE — PROGRESS NOTES
Follow Up [facility] Note       Primary Care Physician: Ming Thompson MD    Interval History:   The patient is a 74 y.o. male      History of Present Illness  The patient presents for sleep apnea.    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 Despite no longer working, he continues to wake up early, a habit he has been unable to break. He reports that he does not engage in sufficient activities during the day to induce fatigue. His sleep duration varies, with some nights lasting only 4 hours. During these shorter sleep periods, he will remove his mask, stay awake for a while, and then return to sleep with the mask on. He uses a nasal cup mask for his sleep apnea treatment. He does not have a bed partner to report on his snoring habits.    Supplemental Information  He recently consulted with Dr. Garces regarding his atrial fibrillation, who reported satisfactory progress and made some adjustments to his medication regimen. His primary care physician has also arranged an appointment with a specialist to evaluate the potential benefits of ablation therapy.         Downloaded PAP Data Evaluated For Therapeutic Response and Compliance:  DME is [facility].  Downloads between 2024 and 12/10/2024.  Average usage is 4 hours and 30 minutes.  Average AHI is 5.3.  PAP pressure is 7.0 CWP.  She used CPAP 72 out of 82 days or 94%.    The patient uses a nasal interface.    Review of Systems:    A complete review of systems was done and all were negative with the exception of none    Social History:    Social History     Socioeconomic History    Marital status:    Tobacco Use    Smoking status: Former     Current packs/day: 0.00     Types: Cigarettes     Quit date:      Years since quittin.9    Smokeless tobacco: Never   Vaping Use    Vaping status: Never Used  "  Substance and Sexual Activity    Alcohol use: Yes     Comment: 4-6 cans beer every night    Drug use: No    Sexual activity: Defer       Allergies:  Patient has no known allergies.     Medication Review:  Reviewed.      Vital Signs:    Vitals:    12/13/24 0700   BP: 143/93   Pulse: 56   SpO2: 99%   Weight: 97.5 kg (215 lb)   Height: 170 cm (66.93\")     Body mass index is 33.75 kg/m².  .BMIFOLLOWUP    Physical Exam:    Constitutional:  Well developed 74 y.o. male that appears in no apparent distress.  Awake & oriented times 3.  Normal mood with normal recent and remote memory and normal judgement.  Eyes:  Conjunctivae normal.      Self-administered [place] Sleepiness Scale test results: 4  0-5 Lower normal daytime sleepiness  6-10 Higher normal daytime sleepiness  11-12 Mild, 13-15 Moderate, & 16-24 Severe excessive daytime sleepiness       I have reviewed the above results and compared them with the patient's last downloads and reviewed with the patient.    Impression:     Encounter Diagnoses   Name Primary?    Class 1 obesity due to excess calories without serious comorbidity with body mass index (BMI) of 33.0 to 33.9 in adult Yes    JENNIFER on CPAP     Resistant hypertension          Assessment & Plan  1. Sleep Apnea.  The patient's condition has shown significant improvement, with a reduction in apneic episodes from 25 to 5 per hour. The pressure settings on his CPAP machine are within acceptable limits, and there are no significant issues with mask leakage. He uses a nasal cup mask and has been using the CPAP machine for about 3.5 to 4.5 hours per night. He reports that his sleep pattern has been irregular for years, partly due to his previous work schedule. He sometimes wakes up after 4 hours of sleep, removes the CPAP machine, stays awake for a while, and then puts it back on when returning to sleep. He is advised to continue using the CPAP machine consistently throughout the night to ensure optimal breathing " and heart health. No changes to the current treatment plan are necessary at this time.    Follow-up  The patient will follow up in 1 year.         Good sleep hygiene measures should be maintained.  Weight loss would be beneficial in this patient who has obesity class I by Body mass index is 33.75 kg/m².      After evaluating the patient and assessing results available, the patient is benefiting from the treatment being provided.     The patient will continue CPAP.  Potential side effects of PAP therapy reviewed and addressed as needed.  After clinical evaluation and review of downloads, I recommend no changes to the patient's pressures.      I answered all of the patient's questions.  The patient will call the [facility] for any problems and will follow up 1 year.    Patient or patient representative verbalized consent for the use of Ambient Listening during the visit with  Kem Guadarrama MD for chart documentation. 12/13/2024  08:58 DEVAN Guadarrama MD  Sleep Medicine  12/13/24  08:50 EST

## 2024-12-17 ENCOUNTER — OFFICE VISIT (OUTPATIENT)
Age: 74
End: 2024-12-17
Payer: MEDICARE

## 2024-12-17 VITALS
BODY MASS INDEX: 34.84 KG/M2 | HEIGHT: 67 IN | WEIGHT: 222 LBS | DIASTOLIC BLOOD PRESSURE: 80 MMHG | HEART RATE: 77 BPM | SYSTOLIC BLOOD PRESSURE: 124 MMHG

## 2024-12-17 DIAGNOSIS — I48.19 ATRIAL FIBRILLATION, PERSISTENT: Primary | ICD-10-CM

## 2024-12-17 NOTE — PROGRESS NOTES
Date of Office Visit: 2024  Encounter Provider: Armand Mckeon MD  Place of Service: [facility]ARD[facility]  Patient Name: Christiano Sandoval  :1950    Chief Complaint   Patient presents with    persistent AFIB   :     HPI: Christiano Sandoval is a 74 y.o. male who presents today for persistent atrial fibrillation.    He was noted to have atrial fibrillation during routine appointment earlier this year.    He does not have any clear symptoms at this time.    We are unsure about the duration of his atrial fibrillation.    He did some research, and is interested in ablation.              Past Medical History:   Diagnosis Date    Atrial fibrillation     Dr Bolivar, clearance in notes    Cancer     PROSTATE    Hard of hearing     BILATERAL    History of hepatitis B     Hyperlipidemia     Hypertension     Kidney stone        Past Surgical History:   Procedure Laterality Date    COLONOSCOPY      EXTRACORPOREAL SHOCK WAVE LITHOTRIPSY (ESWL) Right 2018    Procedure: RIGHT EXTRACORPOREAL SHOCKWAVE LITHOTRIPSY;  Surgeon: Kevin Manzano MD;  Location: [facility];  Service: Urology    EXTRACORPOREAL SHOCK WAVE LITHOTRIPSY (ESWL) Right 2018    Procedure: RIGHT EXTRACORPOREAL SHOCKWAVE LITHOTRIPSY;  Surgeon: Yehuda Lindsay MD;  Location: [facility];  Service: Urology    EYE SURGERY Bilateral     tear ducts    HEMORRHOIDECTOMY      HYDROCELECTOMY Left 7/15/2024    Procedure: LEFT HYDROCELE-HEMATOCELE REMOVAL AND SCROTAL ORCHIECTOMY;  Surgeon: Yehuda Lindsay MD;  Location: [facility];  Service: Urology;  Laterality: Left;       Social History     Socioeconomic History    Marital status:    Tobacco Use    Smoking status: Former     Current packs/day: 0.00     Types: Cigarettes     Quit date:      Years since quittin.9    Smokeless tobacco: Never   Vaping Use    Vaping status: Never Used   Substance and Sexual Activity    Alcohol use: Yes     Comment: 4-6  "cans beer every night    Drug use: No    Sexual activity: Defer       Family History   Problem Relation Age of Onset    Malig Hyperthermia Neg Hx        Review of Systems   Constitutional: Negative.   Cardiovascular: Negative.    Respiratory: Negative.     Gastrointestinal: Negative.        No Known Allergies      Current Outpatient Medications:     apixaban (ELIQUIS) 5 MG tablet tablet, Take 1 tablet by mouth 2 (Two) Times a Day., Disp: 60 tablet, Rfl: 11    atorvastatin (LIPITOR) 10 MG tablet, Take 1 tablet by mouth Daily., Disp: , Rfl:     Multiple Vitamins-Minerals (MULTIVITAL PO), Take 1 tablet by mouth Daily. HOLD FOR SURGERY, Disp: , Rfl:     metoprolol succinate XL (TOPROL-XL) 25 MG 24 hr tablet, Take 1 tablet by mouth Daily., Disp: , Rfl:       Objective:     Vitals:    12/17/24 1304   BP: 124/80   BP Location: Right arm   Patient Position: Sitting   Pulse: 77   Weight: 101 kg (222 lb)   Height: 170 cm (66.93\")     Body mass index is 34.84 kg/m².    PHYSICAL EXAM:    Vitals and nursing note reviewed.   Constitutional:       General: Not in acute distress.  Pulmonary:      Effort: Pulmonary effort is normal. No respiratory distress.   Cardiovascular:      Normal rate. Irregular rhythm.   Skin:     General: Skin is warm and dry.   Neurological:      Mental Status: Alert and oriented to person, place, and time.   Psychiatric:         Behavior: Behavior normal.         Thought Content: Thought content normal.         Judgment: Judgment normal.             ECG 12 Lead    Date/Time: 12/17/2024 3:40 PM  Performed by: Armand Mckeon MD    Authorized by: Armand Mckeon MD  Comparison: compared with previous ECG from 6/19/2024  Similar to previous ECG  Rhythm: atrial fibrillation            Assessment:       Diagnosis Plan   1. Atrial fibrillation, persistent  Cardioversion External in Cardiology Department             Plan:       We discussed.  We will attempt rhythm control, we are going to start with " cardioversion to assess symptoms.    Based on response, we can discuss whether to intensify efforts at rhythm control, or just pursue rate control    As always, it has been a pleasure to participate in your patient's care.      Sincerely,         Armand Mckeon MD

## 2024-12-27 ENCOUNTER — TRANSCRIBE ORDERS (OUTPATIENT)
Dept: CARDIOLOGY | Facility: CLINIC | Age: 74
End: 2024-12-27
Payer: MEDICARE

## 2024-12-27 DIAGNOSIS — Z13.6 SCREENING FOR ISCHEMIC HEART DISEASE: ICD-10-CM

## 2024-12-27 DIAGNOSIS — Z01.810 PRE-OPERATIVE CARDIOVASCULAR EXAMINATION: Primary | ICD-10-CM

## 2025-01-13 ENCOUNTER — LAB (OUTPATIENT)
Dept: LAB | Facility: HOSPITAL | Age: 75
End: 2025-01-13
Payer: MEDICARE

## 2025-01-13 DIAGNOSIS — Z13.6 SCREENING FOR ISCHEMIC HEART DISEASE: ICD-10-CM

## 2025-01-13 DIAGNOSIS — Z01.810 PRE-OPERATIVE CARDIOVASCULAR EXAMINATION: ICD-10-CM

## 2025-01-13 LAB
ANION GAP SERPL CALCULATED.3IONS-SCNC: 7.5 MMOL/L (ref 5–15)
BUN SERPL-MCNC: 19 MG/DL (ref 8–23)
BUN/CREAT SERPL: 15.8 (ref 7–25)
CALCIUM SPEC-SCNC: 9 MG/DL (ref 8.6–10.5)
CHLORIDE SERPL-SCNC: 102 MMOL/L (ref 98–107)
CO2 SERPL-SCNC: 27.5 MMOL/L (ref 22–29)
CREAT SERPL-MCNC: 1.2 MG/DL (ref 0.76–1.27)
DEPRECATED RDW RBC AUTO: 41.2 FL (ref 37–54)
EGFRCR SERPLBLD CKD-EPI 2021: 62.7 ML/MIN/1.73
ERYTHROCYTE [DISTWIDTH] IN BLOOD BY AUTOMATED COUNT: 11.9 % (ref 12.3–15.4)
GLUCOSE SERPL-MCNC: 107 MG/DL (ref 65–99)
HCT VFR BLD AUTO: 42.9 % (ref 37.5–51)
HGB BLD-MCNC: 14.9 G/DL (ref 13–17.7)
MCH RBC QN AUTO: 32.8 PG (ref 26.6–33)
MCHC RBC AUTO-ENTMCNC: 34.7 G/DL (ref 31.5–35.7)
MCV RBC AUTO: 94.5 FL (ref 79–97)
PLATELET # BLD AUTO: 183 10*3/MM3 (ref 140–450)
PMV BLD AUTO: 10.3 FL (ref 6–12)
POTASSIUM SERPL-SCNC: 4.5 MMOL/L (ref 3.5–5.2)
RBC # BLD AUTO: 4.54 10*6/MM3 (ref 4.14–5.8)
SODIUM SERPL-SCNC: 137 MMOL/L (ref 136–145)
WBC NRBC COR # BLD AUTO: 5.88 10*3/MM3 (ref 3.4–10.8)

## 2025-01-13 PROCEDURE — 80048 BASIC METABOLIC PNL TOTAL CA: CPT

## 2025-01-13 PROCEDURE — 36415 COLL VENOUS BLD VENIPUNCTURE: CPT

## 2025-01-13 PROCEDURE — 85027 COMPLETE CBC AUTOMATED: CPT

## 2025-01-17 ENCOUNTER — HOSPITAL ENCOUNTER (OUTPATIENT)
Dept: CARDIOLOGY | Facility: HOSPITAL | Age: 75
Discharge: HOME | End: 2025-01-17
Payer: MEDICARE

## 2025-01-17 VITALS
RESPIRATION RATE: 16 BRPM | BODY MASS INDEX: 31.39 KG/M2 | HEIGHT: 67 IN | HEART RATE: 52 BPM | SYSTOLIC BLOOD PRESSURE: 141 MMHG | DIASTOLIC BLOOD PRESSURE: 88 MMHG | OXYGEN SATURATION: 97 % | WEIGHT: 200 LBS | TEMPERATURE: 97.2 F

## 2025-01-17 DIAGNOSIS — I48.19 ATRIAL FIBRILLATION, PERSISTENT: ICD-10-CM

## 2025-01-17 LAB
QT INTERVAL: 475 MS
QT INTERVAL: 476 MS
QTC INTERVAL: 439 MS
QTC INTERVAL: 538 MS

## 2025-01-17 PROCEDURE — 93005 ELECTROCARDIOGRAM TRACING: CPT

## 2025-01-17 PROCEDURE — 93005 ELECTROCARDIOGRAM TRACING: CPT | Performed by: INTERNAL MEDICINE

## 2025-01-17 PROCEDURE — 92960 CARDIOVERSION ELECTRIC EXT: CPT

## 2025-01-17 RX ORDER — SODIUM CHLORIDE 0.9 % (FLUSH) 0.9 %
10 SYRINGE (ML) INJECTION EVERY 12 HOURS SCHEDULED
Status: DISCONTINUED | OUTPATIENT
Start: 2025-01-17 | End: 2025-01-18 | Stop reason: HOSPADM

## 2025-01-17 RX ORDER — SODIUM CHLORIDE 0.9 % (FLUSH) 0.9 %
10 SYRINGE (ML) INJECTION AS NEEDED
Status: DISCONTINUED | OUTPATIENT
Start: 2025-01-17 | End: 2025-01-18 | Stop reason: HOSPADM

## 2025-01-17 RX ORDER — SODIUM CHLORIDE 9 MG/ML
40 INJECTION, SOLUTION INTRAVENOUS AS NEEDED
Status: DISCONTINUED | OUTPATIENT
Start: 2025-01-17 | End: 2025-01-18 | Stop reason: HOSPADM

## 2025-01-17 RX ADMIN — METHOHEXITAL SODIUM 60 MG: 500 INJECTION, POWDER, LYOPHILIZED, FOR SOLUTION INTRAMUSCULAR; INTRAVENOUS; RECTAL at 08:57

## 2025-01-17 NOTE — H&P
[facility]   HISTORY AND PHYSICAL    Patient Name:Christiano Sandoval  : 1950  MRN: [id number]  Primary Care Physician: Ming Thompson MD  Date of admission: 2025    Subjective   Subjective     Chief Complaint:   Atrial Fibrillation    History of Present Illness   Christiano Sandoval is a 74 y.o. male who presents today for persistent atrial fibrillation.     He was noted to have atrial fibrillation during routine appointment earlier this year.     He does not have any clear symptoms at this time.     We are unsure about the duration of his atrial fibrillation.     He did some research, and is interested in ablation.    Review of Systems   Constitutional:  Negative for activity change and fatigue.   Eyes: Negative.    Respiratory:  Negative for chest tightness and shortness of breath.    Cardiovascular:  Negative for chest pain, palpitations and leg swelling.   Gastrointestinal: Negative.    Endocrine: Negative.    Genitourinary: Negative.    Musculoskeletal: Negative.    Skin: Negative.    Neurological:  Negative for dizziness and syncope.   Hematological: Negative.    Psychiatric/Behavioral: Negative.           Personal History     Past Medical History:   Diagnosis Date    Atrial fibrillation     Dr Bolivar, clearance in notes    Cancer     PROSTATE    Hard of hearing     BILATERAL    History of hepatitis B     Hyperlipidemia     Hypertension     Kidney stone        Past Surgical History:   Procedure Laterality Date    COLONOSCOPY      EXTRACORPOREAL SHOCK WAVE LITHOTRIPSY (ESWL) Right 2018    Procedure: RIGHT EXTRACORPOREAL SHOCKWAVE LITHOTRIPSY;  Surgeon: Kevin Manzano MD;  [facility] [place] [facility];  Service: Urology    EXTRACORPOREAL SHOCK WAVE LITHOTRIPSY (ESWL) Right 2018    Procedure: RIGHT EXTRACORPOREAL SHOCKWAVE LITHOTRIPSY;  Surgeon: Yehuda Lindsya MD;  [facility] [place] [facility];  Service: Urology    EYE SURGERY Bilateral     tear ducts    HEMORRHOIDECTOMY      HYDROCELECTOMY  Left 7/15/2024    Procedure: LEFT HYDROCELE-HEMATOCELE REMOVAL AND SCROTAL ORCHIECTOMY;  Surgeon: Yehuda Lindsay MD;  Location: [facility];  Service: Urology;  Laterality: Left;       Family History: His family history is not on file.     Social History: He  reports that he quit smoking about 46 years ago. His smoking use included cigarettes. He has never used smokeless tobacco. He reports current alcohol use. He reports that he does not use drugs.    Home Medications:  apixaban, atorvastatin, metoprolol succinate XL, and multivitamin with minerals    Allergies:  He has No Known Allergies.    Objective    Objective     Vitals:    BP: ()/()   Arterial Line BP: ()/()   Output by Drain (mL) 01/16/25 0701 - 01/16/25 1900 01/16/25 1901 - 01/17/25 0700 01/17/25 0701 - 01/17/25 0836 Range Total   Requested LDAs do not have output data documented.       Physical Exam  Vitals and nursing note reviewed.   Constitutional:       General: He is not in acute distress.     Appearance: He is not diaphoretic.   HENT:      Mouth/Throat:      Pharynx: No oropharyngeal exudate.   Eyes:      General: No scleral icterus.  Neck:      Trachea: No tracheal deviation.   Cardiovascular:      Rate and Rhythm: Normal rate and regular rhythm.   Pulmonary:      Effort: Pulmonary effort is normal. No respiratory distress.      Breath sounds: Normal breath sounds.   Abdominal:      General: There is no distension.      Palpations: Abdomen is soft.   Skin:     General: Skin is warm and dry.   Neurological:      Mental Status: He is alert and oriented to person, place, and time.   Psychiatric:         Behavior: Behavior normal.         Thought Content: Thought content normal.         Judgment: Judgment normal.          Result Review    Result Review:  I have personally reviewed the results from the time of this admission to 1/17/2025 08:36 EST and agree with these findings:  []  Laboratory list / accordion  []  Microbiology  []  Radiology  []   EKG/Telemetry   []  Cardiology/Vascular   []  Pathology  []  Old records  []  Other:  Most notable findings include: atrial fibrillation      Assessment & Plan   Assessment / Plan     Brief Patient Summary:  Christiano Sandoval is a 74 y.o. male with persistent atrial fibrillation    Active Hospital Problems:  There are no active hospital problems to display for this patient.    Plan:     Proceed with cardioversion    Armand Mckeon MD

## 2025-02-28 ENCOUNTER — OFFICE VISIT (OUTPATIENT)
Age: 75
End: 2025-02-28
Payer: MEDICARE

## 2025-02-28 VITALS — HEART RATE: 75 BPM | BODY MASS INDEX: 35.79 KG/M2 | HEIGHT: 67 IN | WEIGHT: 228 LBS

## 2025-02-28 DIAGNOSIS — I48.19 ATRIAL FIBRILLATION, PERSISTENT: Primary | ICD-10-CM

## 2025-02-28 NOTE — PROGRESS NOTES
"Date of Office Visit: 2025  Encounter Provider: AWAIS Byers  Place of Service: [facility] CARDIOLOGY  Patient Name: Christiano Sandoval  :1950    Chief Complaint   Patient presents with    Hospital Follow Up Visit     1 mth- s/p Ablation    Atrial Fibrillation     Persistent     :     HPI: Christiano Sandoval is a 74 y.o. male who follows with Dr. Durán, referred to Dr. Mckeon for persistent atrial fibrillation.     He was diagnosed with AF when he went for colonoscopy earlier this year.     The duration of his AF was unknown but he remained in persistent AF.     Saw Dr. Mckeon in December and elected to proceed with CV to see if he noted improvement in symptoms.               He presents today for follow up appt.     Patient feels like maybe he was a little better post CV but also says it could be \"psychologic\"     He has no direct awareness of his AF. No palpitations, tachycardia, or heart racing.     His biggest complaints are dyspnea on exertion which seems to have been progressing for years and fatigue.     He feels like these might have improved slightly post CV but not a significant difference.     EKG shows AF. Rate is controlled.     Apixaban for AC.      Past Medical History:   Diagnosis Date    Atrial fibrillation     Dr Bolivar, clearance in notes    Cancer     PROSTATE    Hard of hearing     BILATERAL    History of hepatitis B     Hyperlipidemia     Hypertension     Kidney stone     JENNIFER treated with BiPAP        Past Surgical History:   Procedure Laterality Date    COLONOSCOPY      EXTRACORPOREAL SHOCK WAVE LITHOTRIPSY (ESWL) Right 2018    Procedure: RIGHT EXTRACORPOREAL SHOCKWAVE LITHOTRIPSY;  Surgeon: Kevin Manzano MD;  Location: [facility];  Service: Urology    EXTRACORPOREAL SHOCK WAVE LITHOTRIPSY (ESWL) Right 2018    Procedure: RIGHT EXTRACORPOREAL SHOCKWAVE LITHOTRIPSY;  Surgeon: Yehuda Lindsay MD;  Location: [facility];  " Service: Urology    EYE SURGERY Bilateral     tear ducts    HEMORRHOIDECTOMY      HYDROCELECTOMY Left 7/15/2024    Procedure: LEFT HYDROCELE-HEMATOCELE REMOVAL AND SCROTAL ORCHIECTOMY;  Surgeon: Yehuda Lindsay MD;  Location: [facility];  Service: Urology;  Laterality: Left;       Social History     Socioeconomic History    Marital status:    Tobacco Use    Smoking status: Former     Current packs/day: 0.00     Types: Cigarettes     Quit date:      Years since quittin.1    Smokeless tobacco: Never   Vaping Use    Vaping status: Never Used   Substance and Sexual Activity    Alcohol use: Yes     Comment: 4-6 cans beer every night    Drug use: No    Sexual activity: Defer       Family History   Problem Relation Age of Onset    Malig Hyperthermia Neg Hx        Review of Systems   Constitutional: Negative for chills, fever and malaise/fatigue.   Cardiovascular:  Negative for chest pain, dyspnea on exertion, leg swelling, near-syncope, orthopnea, palpitations, paroxysmal nocturnal dyspnea and syncope.   Respiratory:  Negative for cough and shortness of breath.    Hematologic/Lymphatic: Negative.    Musculoskeletal:  Negative for joint pain, joint swelling and myalgias.   Gastrointestinal:  Negative for abdominal pain, diarrhea, melena, nausea and vomiting.   Genitourinary:  Negative for frequency and hematuria.   Neurological:  Negative for light-headedness, numbness, paresthesias and seizures.   Allergic/Immunologic: Negative.    All other systems reviewed and are negative.      No Known Allergies      Current Outpatient Medications:     apixaban (ELIQUIS) 5 MG tablet tablet, Take 1 tablet by mouth 2 (Two) Times a Day., Disp: 60 tablet, Rfl: 11    atorvastatin (LIPITOR) 10 MG tablet, Take 1 tablet by mouth Daily., Disp: , Rfl:     metoprolol succinate XL (TOPROL-XL) 25 MG 24 hr tablet, Take 1 tablet by mouth Daily., Disp: , Rfl:     Multiple Vitamins-Minerals (MULTIVITAL PO), Take 1 tablet by mouth  "Daily. HOLD FOR SURGERY, Disp: , Rfl:       Objective:     Vitals:    02/28/25 1306   Pulse: 75   Weight: 103 kg (228 lb)   Height: 170.2 cm (67\")     Body mass index is 35.71 kg/m².    PHYSICAL EXAM:    Vitals Reviewed.   General Appearance: No acute distress, well developed and well nourished.   Eyes: Conjunctiva and lids: No erythema, swelling, or discharge. Sclera non-icteric.   HENT: Atraumatic, normocephalic. External eyes, ears, and nose normal.   Respiratory: No signs of respiratory distress. Respiration rhythm and depth normal.   Clear to auscultation. No rales, crackles, rhonchi, or wheezing auscultated.   Cardiovascular:  Heart Rate and Rhythm: Normal, Heart Sounds: Normal S1 and S2. No S3 or S4 noted.  Gastrointestinal:  Abdomen soft, non-distended, non-tender.   Musculoskeletal: Normal movement of extremities  Skin: Warm and dry.   Psychiatric: Patient alert and oriented to person, place, and time. Speech and behavior appropriate. Normal mood and affect.       ECG 12 Lead    Date/Time: 2/28/2025 2:33 PM  Performed by: Sanya Rhodes APRN    Authorized by: Sanya Rhodes APRN  Comparison: compared with previous ECG   Similar to previous ECG  Rhythm: atrial fibrillation            Assessment:       Diagnosis Plan   1. Atrial fibrillation, persistent               Plan:   Persistent AF-----s/p CV---- the duration of his AF is unknown, he has persistent AF that is rate controlled. He has mild symptoms and does not feel like they significantly improved post CV, maybe a little.       We had a long discussion about his AF, I offered him ablation but have to know going in that would likely be 50% chance of maintaining NSR and unclear how much symptoms would improve.       After discussion he wants to think about it and will let me know if he wishes to proceed but sounds like he will likely defer.           Follow up in one month to assess symptoms.           As always, it has been a pleasure to participate in " your patient's care.      Sincerely,         AWAIS Byers

## 2025-04-09 ENCOUNTER — OFFICE VISIT (OUTPATIENT)
Age: 75
End: 2025-04-09
Payer: MEDICARE

## 2025-04-09 VITALS
DIASTOLIC BLOOD PRESSURE: 86 MMHG | SYSTOLIC BLOOD PRESSURE: 140 MMHG | BODY MASS INDEX: 36.1 KG/M2 | WEIGHT: 230 LBS | HEIGHT: 67 IN | HEART RATE: 68 BPM

## 2025-04-09 DIAGNOSIS — I48.19 ATRIAL FIBRILLATION, PERSISTENT: Primary | ICD-10-CM

## 2025-04-09 PROCEDURE — 93000 ELECTROCARDIOGRAM COMPLETE: CPT

## 2025-04-09 PROCEDURE — 99214 OFFICE O/P EST MOD 30 MIN: CPT

## 2025-04-09 PROCEDURE — 3079F DIAST BP 80-89 MM HG: CPT

## 2025-04-09 PROCEDURE — 3077F SYST BP >= 140 MM HG: CPT

## 2025-04-09 NOTE — PROGRESS NOTES
Date of Office Visit: 2025  Encounter Provider: AWAIS Byers  Place of Service: [facility] CARDIOLOG[facility]  Patient Name: Christiano Sandoval  :1950    Chief Complaint   Patient presents with    persistent AFIB    s/p cardioversion    :     HPI: Christiano Sandvoal is a 74 y.o. male who follows with Dr. Durán, referred to Dr. Mckeon for persistent atrial fibrillation.      He was diagnosed with AF when he went for colonoscopy earlier this year.      The duration of his AF was unknown but he remained in persistent AF.      Saw Dr. Mckeon in December and elected to proceed with CV to see if he noted improvement in symptoms.    I saw him 1 month post cardioversion.  He did not really feel like he noticed any significant difference.  We discussed ablation but he wanted to think about it and follow-up in a month.          He presents today for follow-up appointment.    Since cardioversion has been difficult for him to tell if he has noticed any difference.    He continues to have shortness of breath with exertion and does have occasional palpitations.    The dyspnea and fatigue have been present for years but he does attribute them to his A-fib.    He is in persistent rate controlled A-fib today.    He is on apixaban for AC.    His attempts at rhythm control consist of cardioversion.          Past Medical History:   Diagnosis Date    Atrial fibrillation     Dr Bolivar, clearance in notes    Cancer     PROSTATE    Hard of hearing     BILATERAL    History of hepatitis B     Hyperlipidemia     Hypertension     Kidney stone     JENNIFER treated with BiPAP        Past Surgical History:   Procedure Laterality Date    COLONOSCOPY      EXTRACORPOREAL SHOCK WAVE LITHOTRIPSY (ESWL) Right 2018    Procedure: RIGHT EXTRACORPOREAL SHOCKWAVE LITHOTRIPSY;  Surgeon: Kevin Manzano MD;  [place] [facility] [place] [facility]  Service: Urology    EXTRACORPOREAL SHOCK WAVE LITHOTRIPSY (ESWL) Right  2018    Procedure: RIGHT EXTRACORPOREAL SHOCKWAVE LITHOTRIPSY;  Surgeon: Yehuda Lindsay MD;  Location:  [facility] [place];  Service: Urology    EYE SURGERY Bilateral     tear ducts    HEMORRHOIDECTOMY      HYDROCELECTOMY Left 7/15/2024    Procedure: LEFT HYDROCELE-HEMATOCELE REMOVAL AND SCROTAL ORCHIECTOMY;  Surgeon: Yehuda Lindsay MD;  Location: [facility];  Service: Urology;  Laterality: Left;       Social History     Socioeconomic History    Marital status:    Tobacco Use    Smoking status: Former     Current packs/day: 0.00     Types: Cigarettes     Quit date:      Years since quittin.3    Smokeless tobacco: Never   Vaping Use    Vaping status: Never Used   Substance and Sexual Activity    Alcohol use: Yes     Comment: 4-6 cans beer every night    Drug use: No    Sexual activity: Defer       Family History   Problem Relation Age of Onset    Malig Hyperthermia Neg Hx        Review of Systems   Constitutional: Negative for chills, fever and malaise/fatigue.   Cardiovascular:  Negative for chest pain, dyspnea on exertion, leg swelling, near-syncope, orthopnea, palpitations, paroxysmal nocturnal dyspnea and syncope.   Respiratory:  Negative for cough and shortness of breath.    Hematologic/Lymphatic: Negative.    Musculoskeletal:  Negative for joint pain, joint swelling and myalgias.   Gastrointestinal:  Negative for abdominal pain, diarrhea, melena, nausea and vomiting.   Genitourinary:  Negative for frequency and hematuria.   Neurological:  Negative for light-headedness, numbness, paresthesias and seizures.   Allergic/Immunologic: Negative.    All other systems reviewed and are negative.      No Known Allergies      Current Outpatient Medications:     apixaban (ELIQUIS) 5 MG tablet tablet, Take 1 tablet by mouth 2 (Two) Times a Day., Disp: 60 tablet, Rfl: 11    atorvastatin (LIPITOR) 10 MG tablet, Take 1 tablet by mouth Daily., Disp: , Rfl:     Multiple Vitamins-Minerals (MULTIVITAL PO),  "Take 1 tablet by mouth Daily., Disp: , Rfl:     metoprolol succinate XL (TOPROL-XL) 25 MG 24 hr tablet, Take 1 tablet by mouth Daily., Disp: , Rfl:       Objective:     Vitals:    04/09/25 1245   BP: 140/86   BP Location: Right arm   Patient Position: Sitting   Pulse: 68   Weight: 104 kg (230 lb)   Height: 170.2 cm (67\")     Body mass index is 36.02 kg/m².    PHYSICAL EXAM:    Vitals Reviewed.   General Appearance: No acute distress, well developed and well nourished.   Respiratory: No signs of respiratory distress. Respiration rhythm and depth normal.   Cardiovascular:  Heart Rate and Rhythm: Normal  Skin: Warm and dry.   Psychiatric: Patient alert and oriented to person, place, and time. Speech and behavior appropriate. Normal mood and affect.       ECG 12 Lead    Date/Time: 4/9/2025 1:12 PM  Performed by: Sanya Rhodes APRN    Authorized by: Sanya Rhodes APRN  Comparison: compared with previous ECG   Similar to previous ECG  Rhythm: atrial fibrillation            Assessment:       Diagnosis Plan   1. Atrial fibrillation, persistent  Case Request EP Lab: Ablation atrial fibrillation             Plan:   1.  Persistent atrial fibrillation----he is about 2 months post cardioversion and remains in persistent atrial fibrillation.  He attributes his shortness of breath and fatigue to his A-fib or at least feels that it makes it worse.  We discussed A-fib ablation and the 50% chance of maintaining sinus rhythm with 1 ablation.  We discussed the likelihood that he would need to remain on anticoagulation.  They should not miss any doses prior to the procedure.          After discussion of risk, benefits, and alternatives.  Patient is agreeable and wished to proceed with ablation.              I spent at least 30 minutes reviewing previous notes, labs, EKGs, device reports and/or time with the patient.         As always, it has been a pleasure to participate in your patient's care.      Sincerely,         Sanya Rhodes, " APRN

## 2025-04-30 ENCOUNTER — TELEPHONE (OUTPATIENT)
Age: 75
End: 2025-04-30
Payer: MEDICARE

## 2025-04-30 DIAGNOSIS — I48.19 ATRIAL FIBRILLATION, PERSISTENT: Primary | ICD-10-CM

## 2025-04-30 NOTE — TELEPHONE ENCOUNTER
Called and spoke to patient to confirm procedure scheduled for 05/05/25 arriving at 11:30 am. Patient confirmed appointment. Patient is having labs done tomorrow 05/01/25.  Patient was also told to hold all medications except for Eliquis the day of procedure, along with nothing to eat or drink after midnight. Advised patient not to miss any doses of Eliquis and if he does to let the office staff know

## 2025-05-01 ENCOUNTER — LAB (OUTPATIENT)
Dept: LAB | Facility: HOSPITAL | Age: 75
End: 2025-05-01
Payer: MEDICARE

## 2025-05-01 DIAGNOSIS — I48.19 ATRIAL FIBRILLATION, PERSISTENT: ICD-10-CM

## 2025-05-01 LAB
ANION GAP SERPL CALCULATED.3IONS-SCNC: 10 MMOL/L (ref 5–15)
BUN SERPL-MCNC: 21 MG/DL (ref 8–23)
BUN/CREAT SERPL: 21.2 (ref 7–25)
CALCIUM SPEC-SCNC: 9 MG/DL (ref 8.6–10.5)
CHLORIDE SERPL-SCNC: 106 MMOL/L (ref 98–107)
CO2 SERPL-SCNC: 25 MMOL/L (ref 22–29)
CREAT SERPL-MCNC: 0.99 MG/DL (ref 0.76–1.27)
DEPRECATED RDW RBC AUTO: 45.6 FL (ref 37–54)
EGFRCR SERPLBLD CKD-EPI 2021: 78.9 ML/MIN/1.73
ERYTHROCYTE [DISTWIDTH] IN BLOOD BY AUTOMATED COUNT: 12.7 % (ref 12.3–15.4)
GLUCOSE SERPL-MCNC: 104 MG/DL (ref 65–99)
HCT VFR BLD AUTO: 43 % (ref 37.5–51)
HGB BLD-MCNC: 14.3 G/DL (ref 13–17.7)
MCH RBC QN AUTO: 32.8 PG (ref 26.6–33)
MCHC RBC AUTO-ENTMCNC: 33.3 G/DL (ref 31.5–35.7)
MCV RBC AUTO: 98.6 FL (ref 79–97)
PLATELET # BLD AUTO: 162 10*3/MM3 (ref 140–450)
PMV BLD AUTO: 10.7 FL (ref 6–12)
POTASSIUM SERPL-SCNC: 4.3 MMOL/L (ref 3.5–5.2)
RBC # BLD AUTO: 4.36 10*6/MM3 (ref 4.14–5.8)
SODIUM SERPL-SCNC: 141 MMOL/L (ref 136–145)
WBC NRBC COR # BLD AUTO: 5.36 10*3/MM3 (ref 3.4–10.8)

## 2025-05-01 PROCEDURE — 85027 COMPLETE CBC AUTOMATED: CPT

## 2025-05-01 PROCEDURE — 80048 BASIC METABOLIC PNL TOTAL CA: CPT

## 2025-05-01 PROCEDURE — 36415 COLL VENOUS BLD VENIPUNCTURE: CPT

## 2025-05-08 ENCOUNTER — TRANSCRIBE ORDERS (OUTPATIENT)
Dept: ADMINISTRATIVE | Facility: HOSPITAL | Age: 75
End: 2025-05-08
Payer: MEDICARE

## 2025-05-08 ENCOUNTER — LAB (OUTPATIENT)
Dept: LAB | Facility: HOSPITAL | Age: 75
End: 2025-05-08
Payer: MEDICARE

## 2025-05-08 DIAGNOSIS — C61 MALIGNANT NEOPLASM OF PROSTATE: ICD-10-CM

## 2025-05-08 DIAGNOSIS — C61 MALIGNANT NEOPLASM OF PROSTATE: Primary | ICD-10-CM

## 2025-05-08 LAB — TESTOST SERPL-MCNC: 388 NG/DL (ref 193–740)

## 2025-05-08 PROCEDURE — 84403 ASSAY OF TOTAL TESTOSTERONE: CPT

## 2025-05-08 PROCEDURE — 36415 COLL VENOUS BLD VENIPUNCTURE: CPT

## 2025-05-08 PROCEDURE — 84154 ASSAY OF PSA FREE: CPT

## 2025-05-08 PROCEDURE — 84153 ASSAY OF PSA TOTAL: CPT

## 2025-05-09 LAB
PSA FREE MFR SERPL: 5.7 %
PSA FREE SERPL-MCNC: 0.52 NG/ML
PSA SERPL-MCNC: 9.2 NG/ML (ref 0–4)

## 2025-05-28 ENCOUNTER — TELEPHONE (OUTPATIENT)
Age: 75
End: 2025-05-28
Payer: MEDICARE

## 2025-05-28 NOTE — TELEPHONE ENCOUNTER
Called pt to updated arrival of time change to 830.   Pt is agreeable , pt verbalized understanding of instruction.

## 2025-05-30 ENCOUNTER — HOSPITAL ENCOUNTER (OUTPATIENT)
Facility: HOSPITAL | Age: 75
Setting detail: HOSPITAL OUTPATIENT SURGERY
Discharge: HOME | End: 2025-05-30
Attending: INTERNAL MEDICINE | Admitting: INTERNAL MEDICINE
Payer: MEDICARE

## 2025-05-30 ENCOUNTER — ANESTHESIA (OUTPATIENT)
Dept: CARDIOLOGY | Facility: HOSPITAL | Age: 75
End: 2025-05-30
Payer: MEDICARE

## 2025-05-30 ENCOUNTER — ANESTHESIA EVENT (OUTPATIENT)
Dept: CARDIOLOGY | Facility: HOSPITAL | Age: 75
End: 2025-05-30
Payer: MEDICARE

## 2025-05-30 VITALS
HEIGHT: 67 IN | OXYGEN SATURATION: 96 % | HEART RATE: 59 BPM | SYSTOLIC BLOOD PRESSURE: 138 MMHG | RESPIRATION RATE: 16 BRPM | BODY MASS INDEX: 31.39 KG/M2 | DIASTOLIC BLOOD PRESSURE: 79 MMHG | TEMPERATURE: 98.5 F | WEIGHT: 200 LBS

## 2025-05-30 DIAGNOSIS — I48.19 ATRIAL FIBRILLATION, PERSISTENT: ICD-10-CM

## 2025-05-30 LAB
ACT BLD: 297 SECONDS (ref 82–152)
ACT BLD: 366 SECONDS (ref 82–152)
QT INTERVAL: 384 MS
QT INTERVAL: 422 MS
QTC INTERVAL: 409 MS
QTC INTERVAL: 415 MS

## 2025-05-30 PROCEDURE — 25010000002 HEPARIN (PORCINE) PER 1000 UNITS: Performed by: INTERNAL MEDICINE

## 2025-05-30 PROCEDURE — 25810000003 SODIUM CHLORIDE 0.9 % SOLUTION: Performed by: INTERNAL MEDICINE

## 2025-05-30 PROCEDURE — 25010000002 PHENYLEPHRINE 10 MG/ML SOLUTION: Performed by: NURSE ANESTHETIST, CERTIFIED REGISTERED

## 2025-05-30 PROCEDURE — 25010000002 GLYCOPYRROLATE 0.2 MG/ML SOLUTION: Performed by: NURSE ANESTHETIST, CERTIFIED REGISTERED

## 2025-05-30 PROCEDURE — 93656 COMPRE EP EVAL ABLTJ ATR FIB: CPT | Performed by: INTERNAL MEDICINE

## 2025-05-30 PROCEDURE — S0260 H&P FOR SURGERY: HCPCS | Performed by: INTERNAL MEDICINE

## 2025-05-30 PROCEDURE — 93005 ELECTROCARDIOGRAM TRACING: CPT | Performed by: INTERNAL MEDICINE

## 2025-05-30 PROCEDURE — C1730 CATH, EP, 19 OR FEW ELECT: HCPCS | Performed by: INTERNAL MEDICINE

## 2025-05-30 PROCEDURE — 25010000002 ONDANSETRON PER 1 MG: Performed by: NURSE ANESTHETIST, CERTIFIED REGISTERED

## 2025-05-30 PROCEDURE — C1732 CATH, EP, DIAG/ABL, 3D/VECT: HCPCS | Performed by: INTERNAL MEDICINE

## 2025-05-30 PROCEDURE — C1893 INTRO/SHEATH, FIXED,NON-PEEL: HCPCS | Performed by: INTERNAL MEDICINE

## 2025-05-30 PROCEDURE — 93657 TX L/R ATRIAL FIB ADDL: CPT | Performed by: INTERNAL MEDICINE

## 2025-05-30 PROCEDURE — 25010000002 PROTAMINE SULFATE PER 10 MG: Performed by: INTERNAL MEDICINE

## 2025-05-30 PROCEDURE — 25010000002 SUGAMMADEX 200 MG/2ML SOLUTION: Performed by: NURSE ANESTHETIST, CERTIFIED REGISTERED

## 2025-05-30 PROCEDURE — C1759 CATH, INTRA ECHOCARDIOGRAPHY: HCPCS | Performed by: INTERNAL MEDICINE

## 2025-05-30 PROCEDURE — C1894 INTRO/SHEATH, NON-LASER: HCPCS | Performed by: INTERNAL MEDICINE

## 2025-05-30 PROCEDURE — 25010000002 LIDOCAINE-EPINEPHRINE (PF) 1 %-1:200000 SOLUTION: Performed by: INTERNAL MEDICINE

## 2025-05-30 PROCEDURE — 25010000002 LIDOCAINE 2% SOLUTION: Performed by: NURSE ANESTHETIST, CERTIFIED REGISTERED

## 2025-05-30 PROCEDURE — 85347 COAGULATION TIME ACTIVATED: CPT

## 2025-05-30 PROCEDURE — C1733 CATH, EP, OTHR THAN COOL-TIP: HCPCS | Performed by: INTERNAL MEDICINE

## 2025-05-30 PROCEDURE — C1769 GUIDE WIRE: HCPCS | Performed by: INTERNAL MEDICINE

## 2025-05-30 PROCEDURE — C1760 CLOSURE DEV, VASC: HCPCS | Performed by: INTERNAL MEDICINE

## 2025-05-30 PROCEDURE — C1766 INTRO/SHEATH,STRBLE,NON-PEEL: HCPCS | Performed by: INTERNAL MEDICINE

## 2025-05-30 PROCEDURE — 25010000002 DEXAMETHASONE PER 1 MG: Performed by: NURSE ANESTHETIST, CERTIFIED REGISTERED

## 2025-05-30 PROCEDURE — 25010000002 PROPOFOL 10 MG/ML EMULSION: Performed by: NURSE ANESTHETIST, CERTIFIED REGISTERED

## 2025-05-30 PROCEDURE — 25010000002 FAMOTIDINE 10 MG/ML SOLUTION: Performed by: ANESTHESIOLOGY

## 2025-05-30 RX ORDER — DEXAMETHASONE SODIUM PHOSPHATE 4 MG/ML
INJECTION, SOLUTION INTRA-ARTICULAR; INTRALESIONAL; INTRAMUSCULAR; INTRAVENOUS; SOFT TISSUE AS NEEDED
Status: DISCONTINUED | OUTPATIENT
Start: 2025-05-30 | End: 2025-05-30 | Stop reason: SURG

## 2025-05-30 RX ORDER — ACETAMINOPHEN 325 MG/1
650 TABLET ORAL EVERY 4 HOURS PRN
Status: DISCONTINUED | OUTPATIENT
Start: 2025-05-30 | End: 2025-05-30 | Stop reason: HOSPADM

## 2025-05-30 RX ORDER — NALOXONE HCL 0.4 MG/ML
0.4 VIAL (ML) INJECTION
Status: DISCONTINUED | OUTPATIENT
Start: 2025-05-30 | End: 2025-05-30 | Stop reason: HOSPADM

## 2025-05-30 RX ORDER — PROPOFOL 10 MG/ML
VIAL (ML) INTRAVENOUS AS NEEDED
Status: DISCONTINUED | OUTPATIENT
Start: 2025-05-30 | End: 2025-05-30 | Stop reason: SURG

## 2025-05-30 RX ORDER — PROTAMINE SULFATE 10 MG/ML
INJECTION, SOLUTION INTRAVENOUS
Status: DISCONTINUED | OUTPATIENT
Start: 2025-05-30 | End: 2025-05-30 | Stop reason: HOSPADM

## 2025-05-30 RX ORDER — ONDANSETRON 2 MG/ML
4 INJECTION INTRAMUSCULAR; INTRAVENOUS ONCE AS NEEDED
Status: DISCONTINUED | OUTPATIENT
Start: 2025-05-30 | End: 2025-05-30 | Stop reason: HOSPADM

## 2025-05-30 RX ORDER — DIPHENHYDRAMINE HYDROCHLORIDE 50 MG/ML
12.5 INJECTION, SOLUTION INTRAMUSCULAR; INTRAVENOUS
Status: DISCONTINUED | OUTPATIENT
Start: 2025-05-30 | End: 2025-05-30 | Stop reason: HOSPADM

## 2025-05-30 RX ORDER — NALOXONE HCL 0.4 MG/ML
0.2 VIAL (ML) INJECTION AS NEEDED
Status: DISCONTINUED | OUTPATIENT
Start: 2025-05-30 | End: 2025-05-30 | Stop reason: HOSPADM

## 2025-05-30 RX ORDER — DROPERIDOL 2.5 MG/ML
0.62 INJECTION, SOLUTION INTRAMUSCULAR; INTRAVENOUS
Status: DISCONTINUED | OUTPATIENT
Start: 2025-05-30 | End: 2025-05-30 | Stop reason: HOSPADM

## 2025-05-30 RX ORDER — PROMETHAZINE HYDROCHLORIDE 25 MG/1
25 SUPPOSITORY RECTAL ONCE AS NEEDED
Status: DISCONTINUED | OUTPATIENT
Start: 2025-05-30 | End: 2025-05-30 | Stop reason: HOSPADM

## 2025-05-30 RX ORDER — HYDROMORPHONE HYDROCHLORIDE 1 MG/ML
0.5 INJECTION, SOLUTION INTRAMUSCULAR; INTRAVENOUS; SUBCUTANEOUS
Status: DISCONTINUED | OUTPATIENT
Start: 2025-05-30 | End: 2025-05-30 | Stop reason: HOSPADM

## 2025-05-30 RX ORDER — SODIUM CHLORIDE 9 MG/ML
75 INJECTION, SOLUTION INTRAVENOUS CONTINUOUS
Status: DISCONTINUED | OUTPATIENT
Start: 2025-05-30 | End: 2025-05-30 | Stop reason: HOSPADM

## 2025-05-30 RX ORDER — PROMETHAZINE HYDROCHLORIDE 12.5 MG/1
25 TABLET ORAL ONCE AS NEEDED
Status: DISCONTINUED | OUTPATIENT
Start: 2025-05-30 | End: 2025-05-30 | Stop reason: HOSPADM

## 2025-05-30 RX ORDER — ACETAMINOPHEN 650 MG/1
650 SUPPOSITORY RECTAL EVERY 4 HOURS PRN
Status: DISCONTINUED | OUTPATIENT
Start: 2025-05-30 | End: 2025-05-30 | Stop reason: HOSPADM

## 2025-05-30 RX ORDER — FLUMAZENIL 0.1 MG/ML
0.2 INJECTION INTRAVENOUS AS NEEDED
Status: DISCONTINUED | OUTPATIENT
Start: 2025-05-30 | End: 2025-05-30 | Stop reason: HOSPADM

## 2025-05-30 RX ORDER — MIDAZOLAM HYDROCHLORIDE 1 MG/ML
0.5 INJECTION, SOLUTION INTRAMUSCULAR; INTRAVENOUS
Status: DISCONTINUED | OUTPATIENT
Start: 2025-05-30 | End: 2025-05-30 | Stop reason: HOSPADM

## 2025-05-30 RX ORDER — FAMOTIDINE 10 MG/ML
20 INJECTION, SOLUTION INTRAVENOUS ONCE
Status: COMPLETED | OUTPATIENT
Start: 2025-05-30 | End: 2025-05-30

## 2025-05-30 RX ORDER — SODIUM CHLORIDE 0.9 % (FLUSH) 0.9 %
10 SYRINGE (ML) INJECTION EVERY 12 HOURS SCHEDULED
Status: DISCONTINUED | OUTPATIENT
Start: 2025-05-30 | End: 2025-05-30 | Stop reason: HOSPADM

## 2025-05-30 RX ORDER — FENTANYL CITRATE 50 UG/ML
50 INJECTION, SOLUTION INTRAMUSCULAR; INTRAVENOUS ONCE AS NEEDED
Status: DISCONTINUED | OUTPATIENT
Start: 2025-05-30 | End: 2025-05-30 | Stop reason: HOSPADM

## 2025-05-30 RX ORDER — LIDOCAINE HYDROCHLORIDE 20 MG/ML
INJECTION, SOLUTION INFILTRATION; PERINEURAL AS NEEDED
Status: DISCONTINUED | OUTPATIENT
Start: 2025-05-30 | End: 2025-05-30 | Stop reason: SURG

## 2025-05-30 RX ORDER — LIDOCAINE HYDROCHLORIDE 10 MG/ML
0.5 INJECTION, SOLUTION INFILTRATION; PERINEURAL ONCE AS NEEDED
Status: DISCONTINUED | OUTPATIENT
Start: 2025-05-30 | End: 2025-05-30 | Stop reason: HOSPADM

## 2025-05-30 RX ORDER — PHENYLEPHRINE HYDROCHLORIDE 10 MG/ML
INJECTION INTRAVENOUS AS NEEDED
Status: DISCONTINUED | OUTPATIENT
Start: 2025-05-30 | End: 2025-05-30 | Stop reason: SURG

## 2025-05-30 RX ORDER — ROCURONIUM BROMIDE 10 MG/ML
INJECTION, SOLUTION INTRAVENOUS AS NEEDED
Status: DISCONTINUED | OUTPATIENT
Start: 2025-05-30 | End: 2025-05-30 | Stop reason: SURG

## 2025-05-30 RX ORDER — SODIUM CHLORIDE 0.9 % (FLUSH) 0.9 %
3 SYRINGE (ML) INJECTION EVERY 12 HOURS SCHEDULED
Status: DISCONTINUED | OUTPATIENT
Start: 2025-05-30 | End: 2025-05-30 | Stop reason: HOSPADM

## 2025-05-30 RX ORDER — ONDANSETRON 2 MG/ML
4 INJECTION INTRAMUSCULAR; INTRAVENOUS EVERY 6 HOURS PRN
Status: DISCONTINUED | OUTPATIENT
Start: 2025-05-30 | End: 2025-05-30 | Stop reason: HOSPADM

## 2025-05-30 RX ORDER — SODIUM CHLORIDE 0.9 % (FLUSH) 0.9 %
3-10 SYRINGE (ML) INJECTION AS NEEDED
Status: DISCONTINUED | OUTPATIENT
Start: 2025-05-30 | End: 2025-05-30 | Stop reason: HOSPADM

## 2025-05-30 RX ORDER — GLYCOPYRROLATE 0.2 MG/ML
INJECTION INTRAMUSCULAR; INTRAVENOUS AS NEEDED
Status: DISCONTINUED | OUTPATIENT
Start: 2025-05-30 | End: 2025-05-30 | Stop reason: SURG

## 2025-05-30 RX ORDER — ONDANSETRON 2 MG/ML
INJECTION INTRAMUSCULAR; INTRAVENOUS AS NEEDED
Status: DISCONTINUED | OUTPATIENT
Start: 2025-05-30 | End: 2025-05-30 | Stop reason: SURG

## 2025-05-30 RX ORDER — SODIUM CHLORIDE, SODIUM LACTATE, POTASSIUM CHLORIDE, CALCIUM CHLORIDE 600; 310; 30; 20 MG/100ML; MG/100ML; MG/100ML; MG/100ML
9 INJECTION, SOLUTION INTRAVENOUS CONTINUOUS
Status: DISCONTINUED | OUTPATIENT
Start: 2025-05-30 | End: 2025-05-30 | Stop reason: HOSPADM

## 2025-05-30 RX ORDER — HEPARIN SODIUM 1000 [USP'U]/ML
INJECTION, SOLUTION INTRAVENOUS; SUBCUTANEOUS
Status: DISCONTINUED | OUTPATIENT
Start: 2025-05-30 | End: 2025-05-30 | Stop reason: HOSPADM

## 2025-05-30 RX ADMIN — ROCURONIUM BROMIDE 100 MG: 10 INJECTION INTRAVENOUS at 11:07

## 2025-05-30 RX ADMIN — SODIUM CHLORIDE: 9 INJECTION, SOLUTION INTRAVENOUS at 13:01

## 2025-05-30 RX ADMIN — PHENYLEPHRINE HYDROCHLORIDE 200 MCG: 10 INJECTION INTRAVENOUS at 11:59

## 2025-05-30 RX ADMIN — DEXAMETHASONE SODIUM PHOSPHATE 8 MG: 4 INJECTION, SOLUTION INTRA-ARTICULAR; INTRALESIONAL; INTRAMUSCULAR; INTRAVENOUS; SOFT TISSUE at 11:16

## 2025-05-30 RX ADMIN — ONDANSETRON 4 MG: 2 INJECTION INTRAMUSCULAR; INTRAVENOUS at 12:43

## 2025-05-30 RX ADMIN — SUGAMMADEX 200 MG: 100 INJECTION, SOLUTION INTRAVENOUS at 12:45

## 2025-05-30 RX ADMIN — LIDOCAINE HYDROCHLORIDE 80 MG: 20 INJECTION, SOLUTION INFILTRATION; PERINEURAL at 11:06

## 2025-05-30 RX ADMIN — FAMOTIDINE 20 MG: 10 INJECTION INTRAVENOUS at 09:06

## 2025-05-30 RX ADMIN — GLYCOPYRROLATE 0.2 MG: 0.2 INJECTION INTRAMUSCULAR; INTRAVENOUS at 11:16

## 2025-05-30 RX ADMIN — PHENYLEPHRINE HYDROCHLORIDE 100 MCG: 10 INJECTION INTRAVENOUS at 11:25

## 2025-05-30 RX ADMIN — SODIUM CHLORIDE 75 ML/HR: 9 INJECTION, SOLUTION INTRAVENOUS at 08:44

## 2025-05-30 RX ADMIN — PROPOFOL 100 MG: 10 INJECTION, EMULSION INTRAVENOUS at 11:06

## 2025-05-30 NOTE — H&P
[facility]   HISTORY AND PHYSICAL    Patient Name:Christiano Sandoval  : 1950  MRN: [id number]  Primary Care Physician: Ming Thompson MD  Date of admission: 2025    Subjective   Subjective     Chief Complaint:   Atrial Fibrillation    History of Present Illness   Christiano Sandoval is a 74 y.o. male with a one year history of symptomatic persistent atrial fibrillation.  He underwent cardioversion in January, but had early relapse of his atrial fibrillation.  Due to persistent symptoms, we have decided to proceed with ablation.     Review of Systems   Constitutional:  Positive for fatigue. Negative for activity change.   Eyes: Negative.    Respiratory:  Positive for shortness of breath. Negative for chest tightness.    Cardiovascular:  Negative for chest pain, palpitations and leg swelling.   Gastrointestinal: Negative.    Endocrine: Negative.    Genitourinary: Negative.    Musculoskeletal: Negative.    Skin: Negative.    Neurological:  Negative for dizziness and syncope.   Hematological: Negative.    Psychiatric/Behavioral: Negative.           Personal History     Past Medical History:   Diagnosis Date    Atrial fibrillation     Dr Bolivar, jayden in notes    Cancer     PROSTATE    Hard of hearing     BILATERAL    History of hepatitis B     Hyperlipidemia     Hypertension     Kidney stone     JENNIFER treated with BiPAP        Past Surgical History:   Procedure Laterality Date    COLONOSCOPY      EXTRACORPOREAL SHOCK WAVE LITHOTRIPSY (ESWL) Right 2018    Procedure: RIGHT EXTRACORPOREAL SHOCKWAVE LITHOTRIPSY;  Surgeon: Kevin Manzano MD;  Location: [facility] OR [facility] [place];  Service: Urology    EXTRACORPOREAL SHOCK WAVE LITHOTRIPSY (ESWL) Right 2018    Procedure: RIGHT EXTRACORPOREAL SHOCKWAVE LITHOTRIPSY;  Surgeon: Yehuda Lindsay MD;  Location: [facility] OR [facility] [place];  Service: Urology    EYE SURGERY Bilateral     tear ducts    HEMORRHOIDECTOMY      HYDROCELECTOMY Left 7/15/2024    Procedure: LEFT  HYDROCELE-HEMATOCELE REMOVAL AND SCROTAL ORCHIECTOMY;  Surgeon: Yehuda Lindsay MD;  Location: [facility];  Service: Urology;  Laterality: Left;       Family History: His family history is not on file.     Social History: He  reports that he quit smoking about 46 years ago. His smoking use included cigarettes. He has never used smokeless tobacco. He reports current alcohol use. He reports that he does not use drugs.    Home Medications:  apixaban, atorvastatin, metoprolol succinate XL, and multivitamin with minerals    Allergies:  He has no known allergies.    Objective    Objective     Vitals:    Temp:  [97.5 °F (36.4 °C)-98.5 °F (36.9 °C)] 98.5 °F (36.9 °C)  Heart Rate:  [55-59] 56  Resp:  [12-16] 12  BP: (105-136)/(66-95) 105/66  Flow (L/min) (Oxygen Therapy):  [10] 10    Physical Exam  Vitals and nursing note reviewed.   Constitutional:       General: He is not in acute distress.     Appearance: He is not diaphoretic.   HENT:      Mouth/Throat:      Pharynx: No oropharyngeal exudate.   Eyes:      General: No scleral icterus.  Neck:      Trachea: No tracheal deviation.   Cardiovascular:      Rate and Rhythm: Normal rate. Rhythm irregular.   Pulmonary:      Effort: Pulmonary effort is normal. No respiratory distress.      Breath sounds: Normal breath sounds.   Abdominal:      General: There is no distension.      Palpations: Abdomen is soft.   Skin:     General: Skin is warm and dry.   Neurological:      Mental Status: He is alert and oriented to person, place, and time.   Psychiatric:         Behavior: Behavior normal.         Thought Content: Thought content normal.         Judgment: Judgment normal.          Result Review    Result Review:  I have personally reviewed the results from the time of this admission to 5/30/2025 13:25 EDT and agree with these findings:  []  Laboratory list / accordion  []  Microbiology  []  Radiology  []  EKG/Telemetry   []  Cardiology/Vascular   []  Pathology  []  Old records  []   Other:  Most notable findings include: atrial fibrillation      Assessment & Plan   Assessment / Plan     Brief Patient Summary:  Christiano Sandoval is a 74 y.o. male with symptomatic persistent atrial fibrillation.  We have decided to proceed with ablation.  We have discussed the risk of stroke, cardiac injury, and vascular injury.     Active Hospital Problems:  Active Hospital Problems    Diagnosis     **Atrial fibrillation, persistent      Plan:   Proceed with ablation    Armand Mckeon MD

## 2025-05-30 NOTE — Clinical Note
Hemostasis started on the left femoral vein. Perclose was used in achieving hemostasis. Closure device deployed in the vessel. Hemostasis achieved successfully. Closure device additional comment: 2nd canelo

## 2025-05-30 NOTE — DISCHARGE INSTRUCTIONS
[facility]  [address] [place] [address]    Dr. Mckeon's Discharge Instructions for Atrial Fibrillation or Atrial Flutter     Refer to this sheet in the next few weeks. These instructions provide you with information on caring for yourself after your procedure.     Make arrangements to have someone stay with you for 24 hours following the procedure.  This is due to the sedation you received and for your safety in the event of any complications.      About your Procedure:  You have had a procedure called a catheter ablation.  It is used to treat abnormal heartbeats (arrhythmia). The procedure destroyed (ablated) the cells in your heart that were causing your heart rhythm problem. During the procedure, the healthcare provider placed a thin, flexible wire (catheter) into a blood vessel in your groin.  The provider then threaded the catheter to your heart and destroyed the problematic cells.      Goal of Procedure:  The goal of this procedure is to regain a normal heart rhythm (sinus rhythm) and reduce the symptoms associated with your irregular heart rhythm. In many cases, one ablation is enough to treat the arrythmia, but sometimes the problem returns, and a second ablation may be necessary.      What to Expect After the Procedure:  After your procedure, it is typical to have the following sensations:  Minor discomfort or soreness in the chest or a small bump at the insertion site (groin). The bump should usually decrease in size and tenderness within 1 to 2 weeks.  Mild bruising which will usually fade within 2 to 4 weeks.  A mild sore throat  Sometimes the irregular heartbeat goes away immediately after the procedure.  Other times, it may take longer.  As your heart heals, it is common to have some Atrial Fibrillation symptoms and you may even go into atrial fibrillation up to a few months after the procedure.    Do not miss a dose of your blood thinner (Apixaban/Eliquis,  rivaroxaban/Xarelto, Warfarin/Coumadin).   You will have a follow up appointment in approximately 1 month.      Home Care Instructions:  Take your medications exactly as directed.  Do not skip doses unless directed to do so by your healthcare provider.   You should be able to return to most of your normal activities in the next 1-2 days. These include walking, climbing stairs, and doing household chores.   You may remove the dressings in your groin in 24 hours.    You may then shower and gently wash the area with plain soap and water after removing the dressings.     Do not apply powder or lotion to the site.  Do not take baths, swim, or use a hot tub until your health care provider approves and the site is completely healed.  Hold direct pressure to your groin sites any time you laugh, cough, sneeze or change positions.  Do this for the next 48 hours.   Do not bend, squat, or lift anything over 20 lb. (9 kg) for 7 days after your ablation. Do not do any other heavy physical activity for 7 days.  This allows your body time to heal properly.    Inspect the groin sites daily for signs of infection for 7 days. Those signs include: redness, swelling, drainage, or warmth at the groin sites.     Follow instructions about when you can drive or return to work as directed by your physician.    If you experience bleeding or swelling (larger than the size of a golf ball) at the groin sites after you go home, do not remove the dressing. Lie down flat and hold pressure over the sites for at last 10-15 minutes. You or the person with you should call the office at [phone] for further instructions. If the bleeding does not stop after 10-15 minutes, call [phone] and continue holding pressure.  You need to come to the emergency room for evaluation.     Call Your Doctor If:  You experience the symptoms you had before the procedure for more than 24 hours. You have drainage (other than a small amount of blood on the dressing).  You  have chills or a fever > 101.  You have redness, warmth, swelling (larger than a walnut), drainage or severe pain at the insertion site  You develop chest pain or shortness of breath, feel faint, or pass out.  You develop pain, discoloration, coldness, numbness, tingling, or severe bruising in the leg that held the catheter.  You develop bleeding from any other place, such as the bowels.  You have difficulty swallowing, excessive pain when swallowing, difficulty breathing or vomiting of blood  You have unstable vital signs such as blood pressure less than 90/60 or a heart rate greater than 130 beats per minute for more than 1 hour.   You have any symptoms of a stroke.  Remember BE FAST  B-balance. Sudden trouble walking or loss of balance.  E-eyes.  Sudden changes in how you see or a sudden onset of a very bad headache.   F-face. Sudden weakness or loss of feeling of the face or facial droop on one side.   A-arms Sudden weakness or numbness in one arm.  One arm drifts down if they are both held out in front of you. This happens suddenly and usually on one side of the body.  S-speech.  Sudden trouble speaking, slurred speech or trouble understanding what people are saying.   T-time  Time to call emergency services.  Write down the symptoms and the time they started.

## 2025-05-30 NOTE — Clinical Note
The DP pulses are +2 bilaterally. The PT pulses are +1 bilaterally. Heels dry and intact. Heels floated. Cocoon turned on at 43° C

## 2025-05-30 NOTE — ANESTHESIA PROCEDURE NOTES
Airway  Reason: elective    Date/Time: 5/30/2025 11:10 AM  Airway not difficult    General Information and Staff    Patient location during procedure: OR  Anesthesiologist: Srinivas Pascual MD  CRNA/CAA: Adelso Fraser CRNA    Indications and Patient Condition  Indications for airway management: airway protection    Preoxygenated: yes    Mask difficulty assessment: 2 - vent by mask + OA or adjuvant +/- NMBA    Final Airway Details    Final airway type: endotracheal airway      Successful airway: ETT  Cuffed: yes   Successful intubation technique: video laryngoscopy  Adjuncts used in placement: intubating stylet and anterior pressure/BURP  Endotracheal tube insertion site: oral  Blade: CMAC  Blade size: D  ETT size (mm): 7.5  Cormack-Lehane Classification: grade IIb - view of arytenoids or posterior of glottis only  Placement verified by: chest auscultation and capnometry   Inital cuff pressure (cm H2O): 20  Cuff volume (mL): 8  Measured from: lips  ETT/EBT  to lips (cm): 23  Number of attempts at approach: 1  Assessment: lips, teeth, and gum same as pre-op and atraumatic intubation

## 2025-05-30 NOTE — Clinical Note
Hemostasis started on the left femoral vein. Perclose was used in achieving hemostasis. Closure device deployed in the vessel. Hemostasis achieved successfully. Closure device additional comment: 3rd canelo

## 2025-05-30 NOTE — ANESTHESIA POSTPROCEDURE EVALUATION
Patient: Christiano Sandoval    Procedure Summary       Date: 05/30/25 Room / Location: [facility] /  [facility] INVASIVE [facility]    Anesthesia Start: 1056 Anesthesia Stop: 1311    Procedures:       Ablation atrial fibrillation      3D Mapping EP Diagnosis:       Atrial fibrillation, persistent      (persistent atrial fibrillation)    Providers: Armand Mckeon MD Provider: Srinivas Pascual MD    Anesthesia Type: general ASA Status: 3            Anesthesia Type: general    Vitals  Vitals Value Taken Time   /77 05/30/25 13:45   Temp 36.9 °C (98.5 °F) 05/30/25 13:08   Pulse 59 05/30/25 13:57   Resp 16 05/30/25 13:30   SpO2 96 % 05/30/25 13:57   Vitals shown include unfiled device data.        Post Anesthesia Care and Evaluation    Patient location during evaluation: PHASE II  Patient participation: complete - patient participated  Level of consciousness: awake and alert  Pain management: adequate    Airway patency: patent  Anesthetic complications: No anesthetic complications  PONV Status: none  Cardiovascular status: acceptable and hemodynamically stable  Respiratory status: acceptable, nonlabored ventilation and spontaneous ventilation  Hydration status: acceptable

## 2025-06-11 ENCOUNTER — OFFICE VISIT (OUTPATIENT)
Dept: CARDIOLOGY | Facility: CLINIC | Age: 75
End: 2025-06-11
Payer: MEDICARE

## 2025-06-11 VITALS
OXYGEN SATURATION: 99 % | SYSTOLIC BLOOD PRESSURE: 140 MMHG | BODY MASS INDEX: 34.89 KG/M2 | HEIGHT: 67 IN | HEART RATE: 60 BPM | DIASTOLIC BLOOD PRESSURE: 78 MMHG | WEIGHT: 222.3 LBS

## 2025-06-11 DIAGNOSIS — I48.19 ATRIAL FIBRILLATION, PERSISTENT: Primary | ICD-10-CM

## 2025-06-11 DIAGNOSIS — I10 PRIMARY HYPERTENSION: ICD-10-CM

## 2025-06-11 PROCEDURE — 93000 ELECTROCARDIOGRAM COMPLETE: CPT | Performed by: STUDENT IN AN ORGANIZED HEALTH CARE EDUCATION/TRAINING PROGRAM

## 2025-06-11 PROCEDURE — 3077F SYST BP >= 140 MM HG: CPT | Performed by: STUDENT IN AN ORGANIZED HEALTH CARE EDUCATION/TRAINING PROGRAM

## 2025-06-11 PROCEDURE — 99214 OFFICE O/P EST MOD 30 MIN: CPT | Performed by: STUDENT IN AN ORGANIZED HEALTH CARE EDUCATION/TRAINING PROGRAM

## 2025-06-11 PROCEDURE — 1159F MED LIST DOCD IN RCRD: CPT | Performed by: STUDENT IN AN ORGANIZED HEALTH CARE EDUCATION/TRAINING PROGRAM

## 2025-06-11 PROCEDURE — 1160F RVW MEDS BY RX/DR IN RCRD: CPT | Performed by: STUDENT IN AN ORGANIZED HEALTH CARE EDUCATION/TRAINING PROGRAM

## 2025-06-11 PROCEDURE — 3078F DIAST BP <80 MM HG: CPT | Performed by: STUDENT IN AN ORGANIZED HEALTH CARE EDUCATION/TRAINING PROGRAM

## 2025-06-11 NOTE — PROGRESS NOTES
[facility]    Subjective:     Encounter Date:06/11/25      Patient ID: Christiano Sandoval is a 74 y.o. male.    Chief Complaint:   No chief complaint on file.     History of Present Illness    Mr. Sandoval is a pleasant 74 y.o. gentleman past medical history hyperlipidemia, left-sided hydrocele, who presents for further evaluation after abnormal preoperative cardiac testing revealed atrial fibrillation.  Patient has no symptoms, he is not aware of any irregular heartbeat, and he states that he is otherwise felt pretty well.     He was noted to have A-fib on his testing, and he saw his PCP who initiated him on Xarelto therapy, as well as metoprolol succinate.  He had no symptoms from his A-fib and was unaware of it.    He underwent echocardiogram which showed normal LVEF and dilated left atrial chambers.    He is maintaining Eliquis without issue.  He did see Dr. Wilson with cardiac EP, even though his symptoms were minimal, given the fact that he was going to require a lot of rate control in the future and otherwise was doing well, they proceed with rhythm control strategy.  He underwent a cardioversion which only lasted a few days, and then ultimately underwent an ablation last month.  He tolerated well.  He presents today in sinus rhythm.  He thinks his energy levels are better.      Previous card testing:  Echocardiogram June 2024    Left ventricular systolic function is normal. Calculated left ventricular EF = 60.7%    Left ventricular diastolic function was normal.    The right ventricular cavity is moderately dilated.    The left atrial cavity is moderate to severely dilated.    Left atrial volume is severely increased.    The right atrial cavity is severely  dilated.    Peak velocity of the flow distal to the aortic valve is 146 cm/s. Aortic valve maximum pressure gradient is 9 mmHg. Aortic valve mean pressure gradient is 5 mmHg. Aortic valve dimensionless index is 0.7 .    Mild to  moderate mitral valve regurgitation is present.    Mild tricuspid valve regurgitation is present.    Estimated right ventricular systolic pressure from tricuspid regurgitation is normal (<35 mmHg). Calculated right ventricular systolic pressure from tricuspid regurgitation is 31 mmHg.    The following portions of the patient's history were reviewed and updated as appropriate: allergies, current medications, past family history, past medical history, past social history, past surgical history and problem list.    Past Medical History:   Diagnosis Date    Atrial fibrillation     Dr Bolivar, clearance in notes    Cancer     PROSTATE    Hard of hearing     BILATERAL    History of hepatitis B     Hyperlipidemia     Hypertension     Kidney stone     JENNIFER treated with BiPAP        Past Surgical History:   Procedure Laterality Date    CARDIAC ELECTROPHYSIOLOGY PROCEDURE N/A 5/30/2025    Procedure: Ablation atrial fibrillation;  Surgeon: Armand Mckeon MD;  Location: [facility] CATH INVASIVE LOCATION;  Service: Cardiovascular;  Laterality: N/A;    COLONOSCOPY      EXTRACORPOREAL SHOCK WAVE LITHOTRIPSY (ESWL) Right 11/02/2018    Procedure: RIGHT EXTRACORPOREAL SHOCKWAVE LITHOTRIPSY;  Surgeon: Kevin Manzano MD;  [facility];  Service: Urology    EXTRACORPOREAL SHOCK WAVE LITHOTRIPSY (ESWL) Right 12/21/2018    Procedure: RIGHT EXTRACORPOREAL SHOCKWAVE LITHOTRIPSY;  Surgeon: Yehuda Lindsay MD;  Location: [facility] OR [facility];  Service: Urology    EYE SURGERY Bilateral     tear ducts    HEMORRHOIDECTOMY      HYDROCELECTOMY Left 7/15/2024    Procedure: LEFT HYDROCELE-HEMATOCELE REMOVAL AND SCROTAL ORCHIECTOMY;  Surgeon: Yehuda Lindsay MD;  Location: [facility] OR;  Service: Urology;  Laterality: Left;           ECG 12 Lead    Date/Time: 6/11/2025 1:31 PM  Performed by: Ignacio Durán MD    Authorized by: Ignacio Durán MD  Comparison: compared with previous ECG from 4/9/2025  Comparison to previous ECG: Sinus  "rhythm has replaced atrial fibrillation.  Rhythm: sinus rhythm  Rate: normal  Conduction: conduction normal  ST Segments: ST segments normal  T Waves: T waves normal  QRS axis: normal  Other findings: non-specific ST-T wave changes    Clinical impression: non-specific ECG  Comments: Borderline prolonged IA but otherwise sinus rhythm.             Objective:     Vitals:    06/11/25 1029   BP: 140/78   Pulse: 60   SpO2: 99%   Weight: 101 kg (222 lb 4.8 oz)   Height: 170.2 cm (67\")         Constitutional:       Appearance: Healthy appearance. Not in distress.   Neck:      Vascular: JVD normal.   Pulmonary:      Effort: Pulmonary effort is normal.      Breath sounds: Normal breath sounds.   Cardiovascular:      PMI at left midclavicular line. Normal rate. Regular rhythm. Normal S2.       Murmurs: There is no murmur.   Pulses:     Intact distal pulses.   Edema:     Peripheral edema absent.   Skin:     General: Skin is warm and dry.   Neurological:      General: No focal deficit present.      Mental Status: Alert, oriented to person, place, and time and oriented to person, place and time.   Psychiatric:         Mood and Affect: Mood and affect normal.         Lab Review:       BUN   Date Value Ref Range Status   05/01/2025 21 8 - 23 mg/dL Final     Creatinine   Date Value Ref Range Status   05/01/2025 0.99 0.76 - 1.27 mg/dL Final   10/08/2020 1.10 0.60 - 1.30 mg/dL Final     Comment:     Serial Number: [id number]:  [id number]   10/30/2018 0.9 0.6 - 1.3 mg/dL Final     Potassium   Date Value Ref Range Status   05/01/2025 4.3 3.5 - 5.2 mmol/L Final     ALT (SGPT)   Date Value Ref Range Status   02/29/2024 15 1 - 41 U/L Final     Comment:     Appended report. These results have been appended to a previously preliminary verified report.     AST (SGOT)   Date Value Ref Range Status   02/29/2024 16 1 - 40 U/L Final     Comment:     Appended report. These results have been appended to a previously preliminary verified report. "         Performed        Assessment:          Diagnosis Plan   1. Atrial fibrillation, persistent        2. Primary hypertension                   Plan:         Atrial fibrillation: Asymptomatic.  However, given his otherwise younger age and lack of risk factors, rhythm control strategy was pursued..  Underwent ablation with Dr. Wilson May 2025.  Tolerated well.  In sinus rhythm today.  Continue Eliquis  No longer requiring metoprolol.   Hyperlipidemia: On statin therapy, per PCP.      RTC 1 year.  He has follow-up with cardiac EP in the interim.    Ignacio Durán MD  [facility]  06/11/25  10:14 EDT       Current Outpatient Medications:     apixaban (ELIQUIS) 5 MG tablet tablet, Take 1 tablet by mouth 2 (Two) Times a Day., Disp: 60 tablet, Rfl: 11    atorvastatin (LIPITOR) 10 MG tablet, Take 1 tablet by mouth Daily., Disp: , Rfl:     Multiple Vitamins-Minerals (MULTIVITAL PO), Take 1 tablet by mouth Daily., Disp: , Rfl:          Return in about 1 year (around 6/11/2026).      Part of this note may be an electronic transcription/translation of spoken language to printed text using the Dragon Dictation System.

## 2025-07-25 ENCOUNTER — OFFICE VISIT (OUTPATIENT)
Age: 75
End: 2025-07-25
Payer: MEDICARE

## 2025-07-25 VITALS
HEIGHT: 67 IN | BODY MASS INDEX: 34.69 KG/M2 | WEIGHT: 221 LBS | HEART RATE: 93 BPM | DIASTOLIC BLOOD PRESSURE: 80 MMHG | SYSTOLIC BLOOD PRESSURE: 128 MMHG

## 2025-07-25 DIAGNOSIS — I48.19 ATRIAL FIBRILLATION, PERSISTENT: Primary | ICD-10-CM

## 2025-07-25 NOTE — PROGRESS NOTES
Date of Office Visit: 2025  Encounter Provider: AWAIS Byers  Place of Service: [facility] CARDIOLOGY  Patient Name: Christiano Sandoval  :1950    Chief Complaint   Patient presents with    persistent AFIB    s/p ablation    :     HPI: Christiano Sandoval is a 74 y.o. male who follows with Dr. Durán, referred to Dr. Mckeon for persistent atrial fibrillation.      He was diagnosed with AF when he went for colonoscopy earlier this year.      The duration of his AF was unknown but he remained in persistent AF.      Saw Dr. Mckeon in December and elected to proceed with CV to see if he noted improvement in symptoms.    I saw him in April.  He was back in rate controlled A-fib.  Overall he felt symptoms were minimal but he did attribute his shortness of breath and occasional palpitations to the A-fib.  We discussed options and he elected to proceed with ablation.    He underwent PVI and posterior wall ablation in May 2025 with Dr. Mckeon.              He presents today for follow-up appointment.    He says overall since the ablation he has been feeling better.    I asked him what specifically he has noted improvements and he says that he no longer feels palpitations and he feels like he has more energy.    He saw Dr. Durán a month ago and was in sinus rhythm at that time.    Unfortunately he is back in what appears to be an atypical atrial flutter today.  Fortunately his heart rate is well-controlled.    He does not seem to have any symptoms associated with this.    He is on apixaban for AC.    He had posterior wall and PVI ablation in May of this year.          Past Medical History:   Diagnosis Date    Atrial fibrillation     Dr Bolivar, clearance in notes    Cancer     PROSTATE    Hard of hearing     BILATERAL    History of hepatitis B     Hyperlipidemia     Hypertension     Kidney stone     JENNIFRE treated with BiPAP        Past Surgical History:   Procedure Laterality Date     CARDIAC ELECTROPHYSIOLOGY PROCEDURE N/A 2025    Procedure: Ablation atrial fibrillation;  Surgeon: Armand Mckeon MD;  [facility];  Service: Cardiovascular;  Laterality: N/A;    COLONOSCOPY      EXTRACORPOREAL SHOCK WAVE LITHOTRIPSY (ESWL) Right 2018    Procedure: RIGHT EXTRACORPOREAL SHOCKWAVE LITHOTRIPSY;  Surgeon: Kevin Manzano MD;  Location:  [facility];  Service: Urology    EXTRACORPOREAL SHOCK WAVE LITHOTRIPSY (ESWL) Right 2018    Procedure: RIGHT EXTRACORPOREAL SHOCKWAVE LITHOTRIPSY;  Surgeon: Yehuda Lindsay MD;  Location:  [facility];  Service: Urology    EYE SURGERY Bilateral     tear ducts    HEMORRHOIDECTOMY      HYDROCELECTOMY Left 7/15/2024    Procedure: LEFT HYDROCELE-HEMATOCELE REMOVAL AND SCROTAL ORCHIECTOMY;  Surgeon: Yehuda Lindsay MD;  Location:  [facility];  Service: Urology;  Laterality: Left;       Social History     Socioeconomic History    Marital status:    Tobacco Use    Smoking status: Former     Current packs/day: 0.00     Types: Cigarettes     Quit date:      Years since quittin.5    Smokeless tobacco: Never   Vaping Use    Vaping status: Never Used   Substance and Sexual Activity    Alcohol use: Yes     Comment: 4-6 cans beer every night    Drug use: No    Sexual activity: Defer       Family History   Problem Relation Age of Onset    Malig Hyperthermia Neg Hx        Review of Systems   Constitutional: Negative for chills, fever and malaise/fatigue.   Cardiovascular:  Negative for chest pain, dyspnea on exertion, leg swelling, near-syncope, orthopnea, palpitations, paroxysmal nocturnal dyspnea and syncope.   Respiratory:  Negative for cough and shortness of breath.    Hematologic/Lymphatic: Negative.    Musculoskeletal:  Negative for joint pain, joint swelling and myalgias.   Gastrointestinal:  Negative for abdominal pain, diarrhea, melena, nausea and vomiting.   Genitourinary:  Negative for  "frequency and hematuria.   Neurological:  Negative for light-headedness, numbness, paresthesias and seizures.   Allergic/Immunologic: Negative.    All other systems reviewed and are negative.      No Known Allergies      Current Outpatient Medications:     apixaban (ELIQUIS) 5 MG tablet tablet, Take 1 tablet by mouth 2 (Two) Times a Day., Disp: 60 tablet, Rfl: 11    atorvastatin (LIPITOR) 10 MG tablet, Take 1 tablet by mouth Daily., Disp: , Rfl:     Multiple Vitamins-Minerals (MULTIVITAL PO), Take 1 tablet by mouth Daily., Disp: , Rfl:       Objective:     Vitals:    07/25/25 1319   BP: 128/80   BP Location: Left arm   Patient Position: Sitting   Cuff Size: Adult   Pulse: 93   Weight: 100 kg (221 lb)   Height: 170.2 cm (67\")     Body mass index is 34.61 kg/m².    PHYSICAL EXAM:    Vitals Reviewed.   General Appearance: No acute distress, well developed and well nourished.   Respiratory: No signs of respiratory distress. Respiration rhythm and depth normal.   Cardiovascular:  Heart Rate and Rhythm: Normal  Skin: Warm and dry.   Psychiatric: Patient alert and oriented to person, place, and time. Speech and behavior appropriate. Normal mood and affect.       ECG 12 Lead    Date/Time: 7/25/2025 1:30 PM  Performed by: Sanya Rhodes APRN    Authorized by: Sanya Rhodes APRN  Comparison: compared with previous ECG   Similar to previous ECG  Rhythm: atrial flutter            Assessment:       Diagnosis Plan   1. Atrial fibrillation, persistent  Holter Monitor - 72 Hour Up To 15 Days             Plan:   1.  Persistent atrial fibrillation status post PVI and posterior wall ablation--- he appears to be in an atypical atrial flutter today, could also be coarse A-fib but the rate is a little higher than it usually is.    He does not seem to have any symptoms in the contrary he actually thought his symptoms were improved.      We discussed and we are going to do a 2-week monitor to better understand his A-fib and burden, he " was in persistent A-fib prior to the ablation.      I think we should consider redo ablation with likely a Ferra mapping so we can ablate left atrial flutters but we will start with a monitor and I will call him with results and further recommendations.                  I spent at least 30 minutes reviewing previous notes, labs, EKGs, device reports and/or time with the patient.         As always, it has been a pleasure to participate in your patient's care.      Sincerely,         AWAIS Byers

## 2025-08-25 ENCOUNTER — CLINICAL SUPPORT (OUTPATIENT)
Dept: CARDIOLOGY | Age: 75
End: 2025-08-25
Payer: MEDICARE

## 2025-08-25 DIAGNOSIS — I48.19 ATRIAL FIBRILLATION, PERSISTENT: Primary | ICD-10-CM

## (undated) DEVICE — 3/4 IN. X 12 IN. LENGTH: Brand: SILICONE TUBING, PENROSE DRAIN

## (undated) DEVICE — ELECTRD NDL EZ CLN STD 2.75IN

## (undated) DEVICE — SOUNDSTAR ECO 8F G CATHETER: Brand: SOUNDSTAR

## (undated) DEVICE — ANTIBACTERIAL UNDYED BRAIDED (POLYGLACTIN 910), SYNTHETIC ABSORBABLE SUTURE: Brand: COATED VICRYL

## (undated) DEVICE — Device: Brand: REFERENCE PATCH CARTO 3

## (undated) DEVICE — SHEATH STEER ABLAT PULSESELECT/PFA FLEXCATH/CONTOUR 10F 13MM

## (undated) DEVICE — PERCLOSE™ PROSTYLE™ SUTURE-MEDIATED CLOSURE AND REPAIR SYSTEM: Brand: PERCLOSE™ PROSTYLE™

## (undated) DEVICE — LOU EP: Brand: MEDLINE INDUSTRIES, INC.

## (undated) DEVICE — TRANSFER SET 3": Brand: MEDLINE INDUSTRIES, INC.

## (undated) DEVICE — GAUZE,SPONGE,FLUFF,6"X6.75",STRL,5/TRAY: Brand: MEDLINE

## (undated) DEVICE — OCTA,PERSEID,2-2-2-2-2,F-CURVE: Brand: OCTARAY MAPPING CATHETER

## (undated) DEVICE — SUT GUT CHRM 5/0 RB1 27IN U202H

## (undated) DEVICE — GW AMPLATZ  XSTIFF CVD .032 3MM 180CM

## (undated) DEVICE — ACCESS SOLUTION: Brand: VERSACROSS™ ACCESS SOLUTION

## (undated) DEVICE — CATH ABLAT PULSESELECT/PFA OTW 9/ELECTRD 145CM

## (undated) DEVICE — SOL IRR H2O BTL 1000ML STRL

## (undated) DEVICE — SUT GUT CHRM 4/0 RB1 27IN U203H

## (undated) DEVICE — PINNACLE INTRODUCER SHEATH: Brand: PINNACLE

## (undated) DEVICE — PENCL SMOKE/EVAC MEGADYNE TELESCP 10FT

## (undated) DEVICE — PREMIUM WET SKIN PREP TRAY: Brand: MEDLINE INDUSTRIES, INC.

## (undated) DEVICE — Device: Brand: WEBSTER CS

## (undated) DEVICE — GLV SURG SENSICARE PI MIC PF SZ7.5 LF STRL